# Patient Record
Sex: MALE | Race: ASIAN | NOT HISPANIC OR LATINO
[De-identification: names, ages, dates, MRNs, and addresses within clinical notes are randomized per-mention and may not be internally consistent; named-entity substitution may affect disease eponyms.]

---

## 2017-03-21 ENCOUNTER — RESULT REVIEW (OUTPATIENT)
Age: 77
End: 2017-03-21

## 2022-11-11 ENCOUNTER — INPATIENT (INPATIENT)
Facility: HOSPITAL | Age: 82
LOS: 5 days | Discharge: HOME HEALTH | End: 2022-11-17
Attending: HOSPITALIST | Admitting: HOSPITALIST
Payer: MEDICARE

## 2022-11-11 VITALS
DIASTOLIC BLOOD PRESSURE: 77 MMHG | OXYGEN SATURATION: 98 % | SYSTOLIC BLOOD PRESSURE: 148 MMHG | RESPIRATION RATE: 14 BRPM | TEMPERATURE: 98 F | HEART RATE: 69 BPM

## 2022-11-11 DIAGNOSIS — E11.9 TYPE 2 DIABETES MELLITUS WITHOUT COMPLICATIONS: ICD-10-CM

## 2022-11-11 DIAGNOSIS — R29.898 OTHER SYMPTOMS AND SIGNS INVOLVING THE MUSCULOSKELETAL SYSTEM: ICD-10-CM

## 2022-11-11 DIAGNOSIS — Z98.890 OTHER SPECIFIED POSTPROCEDURAL STATES: Chronic | ICD-10-CM

## 2022-11-11 DIAGNOSIS — D69.6 THROMBOCYTOPENIA, UNSPECIFIED: ICD-10-CM

## 2022-11-11 DIAGNOSIS — I10 ESSENTIAL (PRIMARY) HYPERTENSION: ICD-10-CM

## 2022-11-11 DIAGNOSIS — Z29.9 ENCOUNTER FOR PROPHYLACTIC MEASURES, UNSPECIFIED: ICD-10-CM

## 2022-11-11 LAB
ALBUMIN SERPL ELPH-MCNC: 4.2 G/DL — SIGNIFICANT CHANGE UP (ref 3.3–5)
ALP SERPL-CCNC: 71 U/L — SIGNIFICANT CHANGE UP (ref 40–120)
ALT FLD-CCNC: 15 U/L — SIGNIFICANT CHANGE UP (ref 4–41)
ANION GAP SERPL CALC-SCNC: 10 MMOL/L — SIGNIFICANT CHANGE UP (ref 7–14)
APPEARANCE UR: CLEAR — SIGNIFICANT CHANGE UP
AST SERPL-CCNC: 18 U/L — SIGNIFICANT CHANGE UP (ref 4–40)
BASOPHILS # BLD AUTO: 0.01 K/UL — SIGNIFICANT CHANGE UP (ref 0–0.2)
BASOPHILS NFR BLD AUTO: 0.2 % — SIGNIFICANT CHANGE UP (ref 0–2)
BILIRUB SERPL-MCNC: 0.4 MG/DL — SIGNIFICANT CHANGE UP (ref 0.2–1.2)
BILIRUB UR-MCNC: NEGATIVE — SIGNIFICANT CHANGE UP
BUN SERPL-MCNC: 26 MG/DL — HIGH (ref 7–23)
CALCIUM SERPL-MCNC: 9.7 MG/DL — SIGNIFICANT CHANGE UP (ref 8.4–10.5)
CHLORIDE SERPL-SCNC: 104 MMOL/L — SIGNIFICANT CHANGE UP (ref 98–107)
CO2 SERPL-SCNC: 26 MMOL/L — SIGNIFICANT CHANGE UP (ref 22–31)
COLOR SPEC: SIGNIFICANT CHANGE UP
CREAT SERPL-MCNC: 1.01 MG/DL — SIGNIFICANT CHANGE UP (ref 0.5–1.3)
DIFF PNL FLD: NEGATIVE — SIGNIFICANT CHANGE UP
EGFR: 75 ML/MIN/1.73M2 — SIGNIFICANT CHANGE UP
EOSINOPHIL # BLD AUTO: 0.16 K/UL — SIGNIFICANT CHANGE UP (ref 0–0.5)
EOSINOPHIL NFR BLD AUTO: 3 % — SIGNIFICANT CHANGE UP (ref 0–6)
FLUAV AG NPH QL: SIGNIFICANT CHANGE UP
FLUBV AG NPH QL: SIGNIFICANT CHANGE UP
GLUCOSE BLDC GLUCOMTR-MCNC: 127 MG/DL — HIGH (ref 70–99)
GLUCOSE SERPL-MCNC: 129 MG/DL — HIGH (ref 70–99)
GLUCOSE UR QL: ABNORMAL
HCT VFR BLD CALC: 44.2 % — SIGNIFICANT CHANGE UP (ref 39–50)
HGB BLD-MCNC: 14.7 G/DL — SIGNIFICANT CHANGE UP (ref 13–17)
IANC: 3.42 K/UL — SIGNIFICANT CHANGE UP (ref 1.8–7.4)
IMM GRANULOCYTES NFR BLD AUTO: 0.4 % — SIGNIFICANT CHANGE UP (ref 0–0.9)
KETONES UR-MCNC: NEGATIVE — SIGNIFICANT CHANGE UP
LEUKOCYTE ESTERASE UR-ACNC: NEGATIVE — SIGNIFICANT CHANGE UP
LYMPHOCYTES # BLD AUTO: 1.24 K/UL — SIGNIFICANT CHANGE UP (ref 1–3.3)
LYMPHOCYTES # BLD AUTO: 23.3 % — SIGNIFICANT CHANGE UP (ref 13–44)
MAGNESIUM SERPL-MCNC: 2 MG/DL — SIGNIFICANT CHANGE UP (ref 1.6–2.6)
MCHC RBC-ENTMCNC: 29 PG — SIGNIFICANT CHANGE UP (ref 27–34)
MCHC RBC-ENTMCNC: 33.3 GM/DL — SIGNIFICANT CHANGE UP (ref 32–36)
MCV RBC AUTO: 87.2 FL — SIGNIFICANT CHANGE UP (ref 80–100)
MONOCYTES # BLD AUTO: 0.48 K/UL — SIGNIFICANT CHANGE UP (ref 0–0.9)
MONOCYTES NFR BLD AUTO: 9 % — SIGNIFICANT CHANGE UP (ref 2–14)
NEUTROPHILS # BLD AUTO: 3.42 K/UL — SIGNIFICANT CHANGE UP (ref 1.8–7.4)
NEUTROPHILS NFR BLD AUTO: 64.1 % — SIGNIFICANT CHANGE UP (ref 43–77)
NITRITE UR-MCNC: NEGATIVE — SIGNIFICANT CHANGE UP
NRBC # BLD: 0 /100 WBCS — SIGNIFICANT CHANGE UP (ref 0–0)
NRBC # FLD: 0 K/UL — SIGNIFICANT CHANGE UP (ref 0–0)
PH UR: 6 — SIGNIFICANT CHANGE UP (ref 5–8)
PHOSPHATE SERPL-MCNC: 3.3 MG/DL — SIGNIFICANT CHANGE UP (ref 2.5–4.5)
PLATELET # BLD AUTO: 130 K/UL — LOW (ref 150–400)
POTASSIUM SERPL-MCNC: 4.4 MMOL/L — SIGNIFICANT CHANGE UP (ref 3.5–5.3)
POTASSIUM SERPL-SCNC: 4.4 MMOL/L — SIGNIFICANT CHANGE UP (ref 3.5–5.3)
PROT SERPL-MCNC: 7.2 G/DL — SIGNIFICANT CHANGE UP (ref 6–8.3)
PROT UR-MCNC: NEGATIVE — SIGNIFICANT CHANGE UP
RBC # BLD: 5.07 M/UL — SIGNIFICANT CHANGE UP (ref 4.2–5.8)
RBC # FLD: 14.6 % — HIGH (ref 10.3–14.5)
RBC CASTS # UR COMP ASSIST: SIGNIFICANT CHANGE UP /HPF (ref 0–4)
RSV RNA NPH QL NAA+NON-PROBE: SIGNIFICANT CHANGE UP
SARS-COV-2 RNA SPEC QL NAA+PROBE: SIGNIFICANT CHANGE UP
SODIUM SERPL-SCNC: 140 MMOL/L — SIGNIFICANT CHANGE UP (ref 135–145)
SP GR SPEC: 1.03 — SIGNIFICANT CHANGE UP (ref 1.01–1.05)
TROPONIN T, HIGH SENSITIVITY RESULT: 13 NG/L — SIGNIFICANT CHANGE UP
TROPONIN T, HIGH SENSITIVITY RESULT: 13 NG/L — SIGNIFICANT CHANGE UP
UROBILINOGEN FLD QL: SIGNIFICANT CHANGE UP
WBC # BLD: 5.33 K/UL — SIGNIFICANT CHANGE UP (ref 3.8–10.5)
WBC # FLD AUTO: 5.33 K/UL — SIGNIFICANT CHANGE UP (ref 3.8–10.5)
WBC UR QL: SIGNIFICANT CHANGE UP /HPF (ref 0–5)

## 2022-11-11 PROCEDURE — 72131 CT LUMBAR SPINE W/O DYE: CPT | Mod: 26,MA

## 2022-11-11 PROCEDURE — 99285 EMERGENCY DEPT VISIT HI MDM: CPT | Mod: GC

## 2022-11-11 PROCEDURE — 99223 1ST HOSP IP/OBS HIGH 75: CPT

## 2022-11-11 PROCEDURE — 72125 CT NECK SPINE W/O DYE: CPT | Mod: 26,MA

## 2022-11-11 PROCEDURE — 70450 CT HEAD/BRAIN W/O DYE: CPT | Mod: 26,MA

## 2022-11-11 RX ORDER — INSULIN LISPRO 100/ML
VIAL (ML) SUBCUTANEOUS
Refills: 0 | Status: DISCONTINUED | OUTPATIENT
Start: 2022-11-11 | End: 2022-11-17

## 2022-11-11 RX ORDER — DEXTROSE 50 % IN WATER 50 %
15 SYRINGE (ML) INTRAVENOUS ONCE
Refills: 0 | Status: DISCONTINUED | OUTPATIENT
Start: 2022-11-11 | End: 2022-11-17

## 2022-11-11 RX ORDER — ENOXAPARIN SODIUM 100 MG/ML
40 INJECTION SUBCUTANEOUS EVERY 24 HOURS
Refills: 0 | Status: DISCONTINUED | OUTPATIENT
Start: 2022-11-11 | End: 2022-11-17

## 2022-11-11 RX ORDER — SODIUM CHLORIDE 9 MG/ML
1000 INJECTION, SOLUTION INTRAVENOUS
Refills: 0 | Status: DISCONTINUED | OUTPATIENT
Start: 2022-11-11 | End: 2022-11-17

## 2022-11-11 RX ORDER — EMPAGLIFLOZIN 10 MG/1
1 TABLET, FILM COATED ORAL
Qty: 0 | Refills: 0 | DISCHARGE

## 2022-11-11 RX ORDER — DEXTROSE 50 % IN WATER 50 %
25 SYRINGE (ML) INTRAVENOUS ONCE
Refills: 0 | Status: DISCONTINUED | OUTPATIENT
Start: 2022-11-11 | End: 2022-11-17

## 2022-11-11 RX ORDER — GLUCAGON INJECTION, SOLUTION 0.5 MG/.1ML
1 INJECTION, SOLUTION SUBCUTANEOUS ONCE
Refills: 0 | Status: DISCONTINUED | OUTPATIENT
Start: 2022-11-11 | End: 2022-11-17

## 2022-11-11 RX ORDER — METFORMIN HYDROCHLORIDE 850 MG/1
1 TABLET ORAL
Qty: 0 | Refills: 0 | DISCHARGE

## 2022-11-11 RX ORDER — AMLODIPINE BESYLATE 2.5 MG/1
5 TABLET ORAL DAILY
Refills: 0 | Status: DISCONTINUED | OUTPATIENT
Start: 2022-11-11 | End: 2022-11-13

## 2022-11-11 RX ORDER — ACETAMINOPHEN 500 MG
650 TABLET ORAL EVERY 6 HOURS
Refills: 0 | Status: DISCONTINUED | OUTPATIENT
Start: 2022-11-11 | End: 2022-11-17

## 2022-11-11 NOTE — ED PROVIDER NOTE - OBJECTIVE STATEMENT
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 and inability to walk since yesterday. Endorse gradual increase of weakness for the last week, since yesterday has felt "very weak" unable to get up. no headache no neck pain. no cp/sob no abdominal pain. no n/v/d no dysuria no melena. +weakness of both lower ext, right greater than left. no back pain. no urine or bowel incontinence or weakness.

## 2022-11-11 NOTE — ED PROVIDER NOTE - PHYSICAL EXAMINATION
Dr Pierre  elderly male laying in bed, AO3, [place] no sign of head/facial trauma  normal S1-S2 HR 69  nontender chest wall/back. no ecchymosis of back. nontender  midline cervical/thoracic or lumbar spine no step off.   soft nontender abdomen. no  rebound. no guarding. no sign of trauma. no CVAT stable pelvis   no shortening or rotation of bl lower ext. nl sensation. bl leg weakness w right more than left. no leg swelling.

## 2022-11-11 NOTE — H&P ADULT - NSHPLABSRESULTS_GEN_ALL_CORE
.  LABS:                         14.7   5.33  )-----------( 130      ( 2022 15:50 )             44.2         140  |  104  |  26<H>  ----------------------------<  129<H>  4.4   |  26  |  1.01    Ca    9.7      2022 15:50  Phos  3.3       Mg     2.00         TPro  7.2  /  Alb  4.2  /  TBili  0.4  /  DBili  x   /  AST  18  /  ALT  15  /  AlkPhos  71        Urinalysis Basic - ( 2022 15:50 )    Color: Light Yellow / Appearance: Clear / S.032 / pH: x  Gluc: x / Ketone: Negative  / Bili: Negative / Urobili: <2 mg/dL   Blood: x / Protein: Negative / Nitrite: Negative   Leuk Esterase: Negative / RBC: na /HPF / WBC na /HPF   Sq Epi: x / Non Sq Epi: x / Bacteria: x                RADIOLOGY, EKG & ADDITIONAL TESTS: Reviewed.

## 2022-11-11 NOTE — CONSULT NOTE ADULT - SUBJECTIVE AND OBJECTIVE BOX
Neurology - Consult Note - 11-11-22  -  Gracie Henderson DO  PGY-2 Neurology  Spectra: [phone] ([facility]), [phone] ([facility])  -    Name: ANTHONY YAN; 82y (1940)  Chief Complaint: leg weakness    HPI: 81yo M hx of HTN, DM, from home w daughter presents with weakness and a fall last week at home. patient reports inability to walk since yesterday. Endorse gradual increase of weakness for the last week, since yesterday has felt "very weak" unable to get up. MRA as outpatient was apparently not significant for stroke but showed atherosclerosis.      Review of Systems: refer to HPI     Allergies:    PMHx:     PFHx:     PSuHx:       Medications:  MEDICATIONS  (STANDING):  MEDICATIONS  (PRN):    Vitals:  T(C): 36.7 (11-11-22 @ 16:38), Max: 36.7 (11-11-22 @ 16:38)  HR: 72 (11-11-22 @ 16:38) (69 - 72)  BP: 144/76 (11-11-22 @ 16:38) (144/76 - 148/77)  RR: 16 (11-11-22 @ 16:38) (14 - 16)  SpO2: 98% (11-11-22 @ 16:38) (98% - 98%)        Neurological Examination:    - Mental Status: Alert, awake, oriented to person, place, and time; speech is fluent with intact naming, repetition, and follows 1-step and 3-step mid-line crossing commands; good overall fund of knowledge (aware of current events, relevant past history, and vocabulary appropriate for level of education); immediate recall is 3/3 words and delayed recall is 3/3 words at 5 minutes; able to spell WORLD backwards and recite the days of the week in reverse; able to read a sentence.    - Cranial Nerves:  II: Visual fields are full to confrontation; pupils are equal, round, and reactive to light   III, IV, VI: Extraocular movements are intact without nystagmus  V: Facial sensation is intact in the V1-V3 distribution bilaterally  VII: Face is symmetric with normal eye closure and smile  VIII: Hearing is intact to finger rub  IX, X: Uvula is midline and soft palate rises symmetrically  XI: Shoulder shrug intact  XII: Tongue protrudes in the midline    - Motor/Strength Testing:                                 Right           Left  Deltoid                     5                 5  Biceps                      5                 5  Triceps                     5                 5  Wrist Ext (radial)       5                 5  Hand                  5                 5    Hip Flex                   5                  5  Knee Ext	      5                  5  Dorsiflex                  5                  5  Plantarflex               5                  5    - There is no pronator drift.   - Normal muscle bulk and tone throughout.    - Reflexes:   Bicep (C5/C6):                  R 2+ --- L 2+   Brachioradialis (C5/C6) :   R 2+ --- L 2+   Patella (L3/L4) :                 R 2+ --- L 2+   Ankle (S1) :                       R 2+ --- L 2+     - Plant responses down bilaterally.    - Sensory: Intact throughout to light touch.   - Coordination: Finger-nose-finger intact without ataxia or dysmetria.    - Gait: Normal steps, base, arm swing, and turning.     Labs:                        14.7   5.33  )-----------( 130      ( 11 Nov 2022 15:50 )             44.2     11-11    140  |  104  |  26<H>  ----------------------------<  129<H>  4.4   |  26  |  1.01    Ca    9.7      11 Nov 2022 15:50  Phos  3.3     11-11  Mg     2.00     11-11    TPro  7.2  /  Alb  4.2  /  TBili  0.4  /  DBili  x   /  AST  18  /  ALT  15  /  AlkPhos  71  11-11    CAPILLARY BLOOD GLUCOSE      POCT Blood Glucose.: 210 mg/dL (11 Nov 2022 12:18)    LIVER FUNCTIONS - ( 11 Nov 2022 15:50 )  Alb: 4.2 g/dL / Pro: 7.2 g/dL / ALK PHOS: 71 U/L / ALT: 15 U/L / AST: 18 U/L / GGT: x             Radiology:     Neurology - Consult Note - 11-11-22  -  Gracie Henderson DO  PGY-2 Neurology  Spectra: [phone] ([facility]), [phone] ([facility])  -    Name: ANTHONY YAN; 82y (1940)  Chief Complaint: leg weakness    HPI: 81yo M hx of HTN, DM, presents from home with weakness and a fall last week at home. Wife at bedside reports he was getting up from sitting after putting on his shoes when he fell over. Patient reports he went down hard but did not hit head or lose consciousness. Patient reports feeling unbalanced but not dizzy. Weakness in both legs has started since then and has progressively worsened over the past week. Patient states he started walking with a cane since the fall on November 3rd. He says he can walk but he feels like he will fall. Per primary team, MRA as an outpatient was apparently not significant for stroke but showed atherosclerosis. Patient says he started physical therapy in July for overall health. He reports the past week he has pain in both hamstrings that he describes as "an alligator bit a hole in the back of his legs". Described as sharp, throbbing, and constant. Walking or using his legs does not make the pain worse. He thinks it is related to PT and the weakness and pain are inter-related. He saw his PCP for posterior thigh pain but said nothing was wrong. Denies rash in the area. Denies pain radiating from back to legs or pain radiating down legs from thighs. The pain is only located in the hamstring region. Patient denies vision changes, numbness, tingling, burning, tinnitus, headache, n/v, fever, chills, weight loss or gain without trying, back pain. Endorses urinary frequency but wife says that has been ongoing for quite some time.    Review of Systems: refer to HPI     Allergies:    PMHx:     PFHx:     PSuHx:       Medications:  MEDICATIONS  (STANDING):  MEDICATIONS  (PRN):    Vitals:  T(C): 36.7 (11-11-22 @ 16:38), Max: 36.7 (11-11-22 @ 16:38)  HR: 72 (11-11-22 @ 16:38) (69 - 72)  BP: 144/76 (11-11-22 @ 16:38) (144/76 - 148/77)  RR: 16 (11-11-22 @ 16:38) (14 - 16)  SpO2: 98% (11-11-22 @ 16:38) (98% - 98%)      Neurological Examination:    - Mental Status: Alert, awake, oriented to person, place, and time; speech is fluent with intact naming, repetition, and follows 1-step acommands    - Cranial Nerves:  II: Visual fields are full to confrontation; pupils are equal, round, and reactive to light   III, IV, VI: Extraocular movements are intact without nystagmus  V: Facial sensation is intact in the V1-V3 distribution bilaterally  VII: Face is symmetric with normal eye closure and smile  VIII: Hearing is intact to finger rub  IX, X: Uvula is midline and soft palate rises symmetrically  XI: Shoulder shrug intact  XII: Tongue protrudes in the midline    - Motor/Strength Testing:                                 Right           Left  Deltoid                     5                 5  Biceps                      4+                 4+  Triceps                     4+                 4+  Hand                  5                 5    Hip Flex                   4+                  5  Knee Ext	      5                  5  Dorsiflex                  5                  5  Plantarflex               5                  5  - There is no pronator drift.     - Reflexes:   Bicep (C5/C6):                  R 2+ --- L 2+   Brachioradialis (C5/C6) :   R 2+ --- L 2+   Patella (L3/L4) :                 R 2+ --- L 2+   Ankle (S1) :                       R 1+ --- L 1+     - Plantar responses neutral  - Sensory: Intact throughout to light touch.   - Coordination: Finger-nose-finger intact without ataxia or dysmetria.    - Gait: shuffling gait with narrow stance. When standing does not sway and looks strong and confident. When walking does not sway but takes little steps and confidence decreases. does not complain about pain while walking     Labs:                        14.7   5.33  )-----------( 130      ( 11 Nov 2022 15:50 )             44.2     11-11    140  |  104  |  26<H>  ----------------------------<  129<H>  4.4   |  26  |  1.01    Ca    9.7      11 Nov 2022 15:50  Phos  3.3     11-11  Mg     2.00     11-11    TPro  7.2  /  Alb  4.2  /  TBili  0.4  /  DBili  x   /  AST  18  /  ALT  15  /  AlkPhos  71  11-11    CAPILLARY BLOOD GLUCOSE      POCT Blood Glucose.: 210 mg/dL (11 Nov 2022 12:18)    LIVER FUNCTIONS - ( 11 Nov 2022 15:50 )  Alb: 4.2 g/dL / Pro: 7.2 g/dL / ALK PHOS: 71 U/L / ALT: 15 U/L / AST: 18 U/L / GGT: x             Radiology:  CT  Head No intracranial hemorrhage.  CERVICAL SPINE: No acute fracture or traumatic subluxation  L-spine: No acute fracture or traumatic subluxation. Multilevel degenerative change    MRI       Neurology - Consult Note - 11-11-22  -  Gracie Henderson DO  PGY-2 Neurology  Spectra: [phone] ([facility]), [phone] ([facility])  -    Name: ANTHONY YAN; 82y (1940)  Chief Complaint: leg weakness    HPI: 83yo M hx of HTN, DM, presents from home with weakness and a fall last week at home. Wife at bedside reports he was getting up from sitting after putting on his shoes when he fell over. Patient reports he went down hard but did not hit head or lose consciousness. Patient reports feeling unbalanced but not dizzy. Weakness in both legs has started since then and has progressively worsened over the past week. Patient states he started walking with a cane since the fall on November 3rd. He says he can walk but he feels like he will fall. Per primary team, MRA as an outpatient was apparently not significant for stroke but showed atherosclerosis. Patient says he started physical therapy in July for overall health. He reports the past week he has pain in both hamstrings that he describes as "an alligator bit a hole in the back of his legs". Described as sharp, throbbing, and constant. Walking or using his legs does not make the pain worse. He thinks it is related to PT and the weakness and pain are inter-related. He saw his PCP for posterior thigh pain but said nothing was wrong. Denies rash in the area. Denies pain radiating from back to legs or pain radiating down legs from thighs. The pain is only located in the hamstring region. Patient denies vision changes, numbness, tingling, burning, tinnitus, headache, n/v, fever, chills, weight loss or gain without trying, back pain. Endorses urinary frequency but wife says that has been ongoing for quite some time.    Review of Systems: refer to HPI     Allergies:    PMHx:     PFHx:     PSuHx:       Medications:  MEDICATIONS  (STANDING):  MEDICATIONS  (PRN):    Vitals:  T(C): 36.7 (11-11-22 @ 16:38), Max: 36.7 (11-11-22 @ 16:38)  HR: 72 (11-11-22 @ 16:38) (69 - 72)  BP: 144/76 (11-11-22 @ 16:38) (144/76 - 148/77)  RR: 16 (11-11-22 @ 16:38) (14 - 16)  SpO2: 98% (11-11-22 @ 16:38) (98% - 98%)      Neurological Examination:    - Mental Status: Alert, awake, oriented to person, place, and time; speech is fluent with intact naming, repetition, and follows 1-step acommands    - Cranial Nerves:  II: Visual fields are full to confrontation; pupils are equal, round, and reactive to light   III, IV, VI: Extraocular movements are intact without nystagmus  V: Facial sensation is intact in the V1-V3 distribution bilaterally  VII: Face is symmetric with normal eye closure and smile  VIII: Hearing is intact to finger rub  IX, X: Uvula is midline and soft palate rises symmetrically  XI: Shoulder shrug intact  XII: Tongue protrudes in the midline    - Motor/Strength Testing:                                 Right           Left  Deltoid                     5                 5  Biceps                      4+                 4+  Triceps                     4+                 4+  Hand                  5                 5    Hip Flex                   4+                  5  Knee Ext	      5                  5  Dorsiflex                  5                  5  Plantarflex               5                  5  - There is no pronator drift.   -paratonic throughout     - Reflexes:   Bicep (C5/C6):                  R 2+ --- L 2+   Brachioradialis (C5/C6) :   R 2+ --- L 2+   Patella (L3/L4) :                 R 2+ --- L 2+   Ankle (S1) :                       R 1+ --- L 1+     - Plantar responses neutral  - Sensory: Intact throughout to light touch.   - Coordination: Finger-nose-finger intact without ataxia or dysmetria.    - Gait: shuffling gait with narrow stance. When standing does not sway and looks strong and confident. When walking does not sway but takes little steps and confidence decreases. does not complain about pain while walking     Labs:                        14.7   5.33  )-----------( 130      ( 11 Nov 2022 15:50 )             44.2     11-11    140  |  104  |  26<H>  ----------------------------<  129<H>  4.4   |  26  |  1.01    Ca    9.7      11 Nov 2022 15:50  Phos  3.3     11-11  Mg     2.00     11-11    TPro  7.2  /  Alb  4.2  /  TBili  0.4  /  DBili  x   /  AST  18  /  ALT  15  /  AlkPhos  71  11-11    CAPILLARY BLOOD GLUCOSE      POCT Blood Glucose.: 210 mg/dL (11 Nov 2022 12:18)    LIVER FUNCTIONS - ( 11 Nov 2022 15:50 )  Alb: 4.2 g/dL / Pro: 7.2 g/dL / ALK PHOS: 71 U/L / ALT: 15 U/L / AST: 18 U/L / GGT: x             Radiology:  CT  Head No intracranial hemorrhage.  CERVICAL SPINE: No acute fracture or traumatic subluxation  L-spine: No acute fracture or traumatic subluxation. Multilevel degenerative change    MRI

## 2022-11-11 NOTE — H&P ADULT - HISTORY OF PRESENT ILLNESS
83yo M hx of HTN, DM, presents from home with weakness and a fall last week at home.     In ED, vitals T 97.8, HR 89, /77, RR 14, O2 98% on RA  Labs significant for plt 130  EKG:  CTH, CT-c spine: IMPRESSION:    BRAIN CT : No intracranial hemorrhage.  CERVICAL SPINE CT: No acute fracture or traumatic subluxation  CT L-spine: IMPRESSION:  No acute fracture or traumatic subluxation. Multilevel   degenerative change  ED management: Neuro consulted    81yo M hx of HTN, DM, presents from home with b/l LE weakness and a fall last week at home. Pt states symptoms of b/l LE weakness initially started with his right leg about 1 week ago and then progressed to his left leg. He states he was able to ambulate independently however now has to use a cane this week. He states the weakness is mainly in behind his thighs. He denies any numbness, tingling or pain. Denies any b/b incontinence, light headedness, dizziness, or HA. He states he fell 1 week ago due to the weakness. He denies hitting his head or LOC. 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 He denies any fever, chills, chest pain, abd pain, n/v/d, or urinary symptoms.      In ED, vitals T 97.8, HR 89, /77, RR 14, O2 98% on RA  Labs significant for plt 130  EKG: NSR w/ 1st degree AV block, incomplete rbbb, no acute ischemic changes  CTH, CT-c spine: IMPRESSION:    BRAIN CT : No intracranial hemorrhage.  CERVICAL SPINE CT: No acute fracture or traumatic subluxation  CT L-spine: IMPRESSION:  No acute fracture or traumatic subluxation. Multilple  degenerative change  ED management: Neuro consulted

## 2022-11-11 NOTE — H&P ADULT - PROBLEM SELECTOR PLAN 4
Plt 130, previous plt 141 in 2019 w/ w/u in past. Neg HIV and hep C and MM w/u. \  - continue to monitor

## 2022-11-11 NOTE — CONSULT NOTE ADULT - ATTENDING COMMENTS
History from family and patient.   Baseline walks long distances without difficulty.    On 11/3 he woke up from a nap and noticed gait imbalance, dysarthria.  He is also c/o B hamstring pain. Family noted left facial weakness.     MS:  Slightly slow to follow commands and answer questions.   CN: left UMN type facial weakness.  Mild dysarthria.  Motor: RSM slow, B.  No pronator drift. Variable effort - R HF with definite weakness.    Reflexes 2 in the arms. Left BR with spread to FF.  Knees 1, ankles 0.  Gait: slow narrow base, tentative.     NIHSS: 2 (Dysarthria 1; Facial palsy 1)  Baseline mRS 0    He had an MRA Head as an outpatient.   A1C with Estimated Average Glucose Result: 6.2    A/P  Mr. Williamson is an 83 yo man with sudden onset of gait difficulty, dysarthria and left facial weakness on 11/3/22.  Possible stroke.   Aspirin 81 mg now then daily.  Lipitor 80 mg now then daily  Lipid profile  MRI brain  MRA neck  TTE  Telemetry  PT/OT/Speech  D/W patient, family, primary ACP.  Thank you.

## 2022-11-11 NOTE — H&P ADULT - NSHPPHYSICALEXAM_GEN_ALL_CORE
VITAL SIGNS:  T(C): 36.6 (11-11-22 @ 19:17), Max: 36.7 (11-11-22 @ 16:38)  T(F): 97.9 (11-11-22 @ 19:17), Max: 98 (11-11-22 @ 16:38)  HR: 89 (11-11-22 @ 19:17) (69 - 89)  BP: 157/85 (11-11-22 @ 19:17) (144/76 - 157/85)  BP(mean): --  RR: 18 (11-11-22 @ 19:17) (14 - 18)  SpO2: 99% (11-11-22 @ 19:17) (98% - 99%)  Wt(kg): --    PHYSICAL EXAM:    Constitutional: WDWN resting comfortably in bed; NAD  Head: NC/AT  Eyes: PERRL, EOMI, anicteric sclera  ENT: no nasal discharge; uvula midline, no oropharyngeal erythema or exudates; MMM  Neck: supple; no JVD or thyromegaly  Respiratory: CTA B/L; no W/R/R, no retractions  Cardiac: +S1/S2; RRR; no M/R/G  Gastrointestinal: abdomen soft, NT/ND; no rebound or guarding; +BSx4  Back: spine midline, no bony tenderness or step-offs  Extremities: WWP, no clubbing or cyanosis; no peripheral edema  Musculoskeletal: NROM x4; no joint swelling, tenderness or erythema  Vascular: 2+ radial, DP/PT pulses B/L  Dermatologic: skin warm, dry and intact; no rashes, wounds, or scars  Lymphatic: no submandibular or cervical LAD  Neurologic: AAOx3; CNII-XII grossly intact; no focal deficits  Psychiatric: affect and characteristics of appearance, verbalizations, behaviors are appropriate VITAL SIGNS:  T(C): 36.6 (11-11-22 @ 19:17), Max: 36.7 (11-11-22 @ 16:38)  T(F): 97.9 (11-11-22 @ 19:17), Max: 98 (11-11-22 @ 16:38)  HR: 89 (11-11-22 @ 19:17) (69 - 89)  BP: 157/85 (11-11-22 @ 19:17) (144/76 - 157/85)  BP(mean): --  RR: 18 (11-11-22 @ 19:17) (14 - 18)  SpO2: 99% (11-11-22 @ 19:17) (98% - 99%)  Wt(kg): --    PHYSICAL EXAM:    Constitutional: WDWN resting comfortably in bed; NAD  Head: NC/AT  Eyes: PERRL, EOMI, anicteric sclera  ENT: no nasal discharge; uvula midline, no oropharyngeal erythema or exudates; MMM  Neck: supple  Respiratory: CTA B/L; no W/R/R, no retractions  Cardiac: +S1/S2; RRR; no M/R/G  Gastrointestinal: abdomen soft, NT/ND; no rebound or guarding; +BSx4  Back: spine midline, no bony tenderness  Extremities: WWP, no clubbing or cyanosis; no peripheral edema  Musculoskeletal: NROM x4; no joint swelling, tenderness or erythema  Vascular: distal pulses intact  Dermatologic: skin warm, dry and intact; no rashes  Lymphatic: no submandibular or cervical LAD  Neurologic: AAOx3; CNII-XII grossly intact; strength 4/5 RLE, 5/5 LLE. 5/5 b/l UE. Sensation intact b/l UE and LE.   Psychiatric: affect and characteristics of appearance, verbalizations, behaviors are appropriate

## 2022-11-11 NOTE — CONSULT NOTE ADULT - ASSESSMENT
Assessment:    Impression:    Plan Assessment: 81yo M hx of HTN, DM, presents from home with weakness and a fall last week at home. Wife at bedside reports he was getting up from sitting after putting on his shoes when he fell over. Patient reports he went down hard but did not hit head or lose consciousness. Patient reports feeling unbalanced but not dizzy. Weakness in both legs has started since then and has progressively worsened over the past week. Patient states he started walking with a cane since the fall on November 3rd. He says he can walk but he feels like he will fall. Per primary team, MRA as an outpatient was apparently not significant for stroke but showed atherosclerosis. Patient says he started physical therapy in July for overall health. He reports the past week he has pain in both hamstrings that he describes as "an alligator bit a hole in the back of his legs". Described as sharp, throbbing, and constant. Walking or using his legs does not make the pain worse. He thinks it is related to PT and the weakness and pain are inter-related. He saw his PCP for posterior thigh pain but said nothing was wrong. Denies rash in the area. Denies pain radiating from back to legs or pain radiating down legs from thighs. The pain is only located in the hamstring region. Patient denies vision changes, numbness, tingling, burning, tinnitus, headache, n/v, fever, chills, weight loss or gain without trying, back pain. Endorses urinary frequency but wife says that has been ongoing for quite some time. 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 Nettie Bennett. To be seen in the AM but attending. Assessment: 83yo M hx of HTN, DM, presents from home with weakness and a fall last week at home. Wife at bedside reports he was getting up from sitting after putting on his shoes when he fell over. Patient reports he went down hard but did not hit head or lose consciousness. Patient reports feeling unbalanced but not dizzy. Weakness in both legs has started since then and has progressively worsened over the past week. Patient states he started walking with a cane since the fall on November 3rd. He says he can walk but he feels like he will fall. Per primary team, MRA as an outpatient was apparently not significant for stroke but showed atherosclerosis. Patient says he started physical therapy in July for overall health. He reports the past week he has pain in both hamstrings that he describes as "an alligator bit a hole in the back of his legs". Described as sharp, throbbing, and constant. Walking or using his legs does not make the pain worse. He thinks it is related to PT and the weakness and pain are inter-related. He saw his PCP for posterior thigh pain but said nothing was wrong. Denies rash in the area. Denies pain radiating from back to legs or pain radiating down legs from thighs. The pain is only located in the hamstring region. Patient denies vision changes, numbness, tingling, burning, tinnitus, headache, n/v, fever, chills, weight loss or gain without trying, back pain. Endorses urinary frequency but wife says that has been ongoing for quite some time. CT head c- and l-spine unremarkable    Impression: bilateral leg weakness R>L accompanied by 1 fall, b/l hamstring pain 2/2 to unknown etiology    Plan  [] PT/OT evaluation      Case discussed with Neurology Attending Dr. Nettie Bennett. To be seen in the AM by attending.

## 2022-11-11 NOTE — ED PROVIDER NOTE - CLINICAL SUMMARY MEDICAL DECISION MAKING FREE TEXT BOX
Ling Gifford DO PGY-2  82 year old male with PMH DM, HTN presents with lower extremity weakness and fall last week at home. Exam with lower extremity weakness R > L. No midline spinal tenderness, no saddle anesthesia. Will obtain CTs, labs, EKG, UA, and re-evaluate for dispo.

## 2022-11-11 NOTE — H&P ADULT - ASSESSMENT
81yo M hx of HTN, DM, presents from home with LE weakness and a fall last week at home. CTH, c-spine, L-spine unremarkable w/o acute pathology. Pt admitted for further evaluation of LE weakness and PT eval.

## 2022-11-11 NOTE — ED ADULT NURSE NOTE - OBJECTIVE STATEMENT
Pt AOX4 c/o pain in Right leg travels down back of thigh, denies chest pain, denies SOB, has been experiencing weakness in that leg, fell on Sat 11/4 on the floor at home because he couldn't bear weight on the leg; pt has PMHx of DM type 2, no cardiac Hx.  Labs drawn and sent, 20G placed Left AC; pt's family at bedside.

## 2022-11-11 NOTE — ED PROVIDER NOTE - PROGRESS NOTE DETAILS
pt stable pending labs/ ct to be done. Daughter  will bring MRI report, MRI done on Tuesday of this week. sign out to night team, pt tba Ling Gifford DO PGY-2  Spoke with [facility] neuroradiologist attending Dr. Morgan who read MRA performed outpatient on Tuesday. States that he saw some atherosclerotic changes on anterior and middle cerebral artery but no findings concerning for stroke. Ling Gifford, DO PGY-2  Consulted neurology, will see patient in the ED. daughter called with additional hx that the patients brother had als

## 2022-11-11 NOTE — ED ADULT TRIAGE NOTE - CHIEF COMPLAINT QUOTE
Pt arrives in wheelchair to triage c/o unsteady gait x 1 wk. Pt had a fall in the last week, states "my legs gave out." Denies hitting head or LOC. Pt pending outpatient MRI. PMH: DM, HTN.

## 2022-11-11 NOTE — H&P ADULT - PROBLEM SELECTOR PLAN 1
Pt presenting with b/l LE weakness RLE > LLE x1 week. CTH, c-spine and l-spine w/o acute pathology. Neuro consulted and concern for myopathy, possible cord compression. Pt reports family hx of ALS.    - f/u MR l-spine  - f/u neuro recs  - neuro checks q4h

## 2022-11-11 NOTE — H&P ADULT - NSICDXFAMILYHX_GEN_ALL_CORE_FT
FAMILY HISTORY:  No pertinent family history in first degree relatives FAMILY HISTORY:  Mother  Still living? Unknown  FH: HTN (hypertension), Age at diagnosis: Age Unknown    Sibling  Still living? Unknown  FH: ALS (amyotrophic lateral sclerosis), Age at diagnosis: Age Unknown

## 2022-11-12 LAB
A1C WITH ESTIMATED AVERAGE GLUCOSE RESULT: 6.2 % — HIGH (ref 4–5.6)
ANION GAP SERPL CALC-SCNC: 10 MMOL/L — SIGNIFICANT CHANGE UP (ref 7–14)
BASOPHILS # BLD AUTO: 0.01 K/UL — SIGNIFICANT CHANGE UP (ref 0–0.2)
BASOPHILS NFR BLD AUTO: 0.2 % — SIGNIFICANT CHANGE UP (ref 0–2)
BUN SERPL-MCNC: 26 MG/DL — HIGH (ref 7–23)
CALCIUM SERPL-MCNC: 9.4 MG/DL — SIGNIFICANT CHANGE UP (ref 8.4–10.5)
CHLORIDE SERPL-SCNC: 107 MMOL/L — SIGNIFICANT CHANGE UP (ref 98–107)
CK SERPL-CCNC: 83 U/L — SIGNIFICANT CHANGE UP (ref 30–200)
CO2 SERPL-SCNC: 23 MMOL/L — SIGNIFICANT CHANGE UP (ref 22–31)
CREAT SERPL-MCNC: 0.95 MG/DL — SIGNIFICANT CHANGE UP (ref 0.5–1.3)
EGFR: 80 ML/MIN/1.73M2 — SIGNIFICANT CHANGE UP
EOSINOPHIL # BLD AUTO: 0.15 K/UL — SIGNIFICANT CHANGE UP (ref 0–0.5)
EOSINOPHIL NFR BLD AUTO: 2.9 % — SIGNIFICANT CHANGE UP (ref 0–6)
ESTIMATED AVERAGE GLUCOSE: 131 — SIGNIFICANT CHANGE UP
FOLATE SERPL-MCNC: 19.9 NG/ML — HIGH (ref 3.1–17.5)
GLUCOSE BLDC GLUCOMTR-MCNC: 119 MG/DL — HIGH (ref 70–99)
GLUCOSE BLDC GLUCOMTR-MCNC: 130 MG/DL — HIGH (ref 70–99)
GLUCOSE BLDC GLUCOMTR-MCNC: 181 MG/DL — HIGH (ref 70–99)
GLUCOSE BLDC GLUCOMTR-MCNC: 209 MG/DL — HIGH (ref 70–99)
GLUCOSE SERPL-MCNC: 136 MG/DL — HIGH (ref 70–99)
HCT VFR BLD CALC: 45.1 % — SIGNIFICANT CHANGE UP (ref 39–50)
HGB BLD-MCNC: 15.1 G/DL — SIGNIFICANT CHANGE UP (ref 13–17)
IANC: 3.16 K/UL — SIGNIFICANT CHANGE UP (ref 1.8–7.4)
IMM GRANULOCYTES NFR BLD AUTO: 0.2 % — SIGNIFICANT CHANGE UP (ref 0–0.9)
LYMPHOCYTES # BLD AUTO: 1.36 K/UL — SIGNIFICANT CHANGE UP (ref 1–3.3)
LYMPHOCYTES # BLD AUTO: 26.5 % — SIGNIFICANT CHANGE UP (ref 13–44)
MAGNESIUM SERPL-MCNC: 2 MG/DL — SIGNIFICANT CHANGE UP (ref 1.6–2.6)
MCHC RBC-ENTMCNC: 28.9 PG — SIGNIFICANT CHANGE UP (ref 27–34)
MCHC RBC-ENTMCNC: 33.5 GM/DL — SIGNIFICANT CHANGE UP (ref 32–36)
MCV RBC AUTO: 86.4 FL — SIGNIFICANT CHANGE UP (ref 80–100)
MONOCYTES # BLD AUTO: 0.45 K/UL — SIGNIFICANT CHANGE UP (ref 0–0.9)
MONOCYTES NFR BLD AUTO: 8.8 % — SIGNIFICANT CHANGE UP (ref 2–14)
NEUTROPHILS # BLD AUTO: 3.16 K/UL — SIGNIFICANT CHANGE UP (ref 1.8–7.4)
NEUTROPHILS NFR BLD AUTO: 61.4 % — SIGNIFICANT CHANGE UP (ref 43–77)
NRBC # BLD: 0 /100 WBCS — SIGNIFICANT CHANGE UP (ref 0–0)
NRBC # FLD: 0 K/UL — SIGNIFICANT CHANGE UP (ref 0–0)
PHOSPHATE SERPL-MCNC: 3.7 MG/DL — SIGNIFICANT CHANGE UP (ref 2.5–4.5)
PLATELET # BLD AUTO: 122 K/UL — LOW (ref 150–400)
POTASSIUM SERPL-MCNC: 3.8 MMOL/L — SIGNIFICANT CHANGE UP (ref 3.5–5.3)
POTASSIUM SERPL-SCNC: 3.8 MMOL/L — SIGNIFICANT CHANGE UP (ref 3.5–5.3)
RBC # BLD: 5.22 M/UL — SIGNIFICANT CHANGE UP (ref 4.2–5.8)
RBC # FLD: 14.4 % — SIGNIFICANT CHANGE UP (ref 10.3–14.5)
SODIUM SERPL-SCNC: 140 MMOL/L — SIGNIFICANT CHANGE UP (ref 135–145)
TSH SERPL-MCNC: 2.07 UIU/ML — SIGNIFICANT CHANGE UP (ref 0.27–4.2)
VIT B12 SERPL-MCNC: 922 PG/ML — HIGH (ref 200–900)
WBC # BLD: 5.14 K/UL — SIGNIFICANT CHANGE UP (ref 3.8–10.5)
WBC # FLD AUTO: 5.14 K/UL — SIGNIFICANT CHANGE UP (ref 3.8–10.5)

## 2022-11-12 PROCEDURE — 99223 1ST HOSP IP/OBS HIGH 75: CPT

## 2022-11-12 PROCEDURE — 99232 SBSQ HOSP IP/OBS MODERATE 35: CPT

## 2022-11-12 RX ORDER — ATORVASTATIN CALCIUM 80 MG/1
80 TABLET, FILM COATED ORAL AT BEDTIME
Refills: 0 | Status: DISCONTINUED | OUTPATIENT
Start: 2022-11-12 | End: 2022-11-17

## 2022-11-12 RX ORDER — ASPIRIN/CALCIUM CARB/MAGNESIUM 324 MG
81 TABLET ORAL DAILY
Refills: 0 | Status: DISCONTINUED | OUTPATIENT
Start: 2022-11-12 | End: 2022-11-17

## 2022-11-12 RX ORDER — BNT162B2 ORIGINAL AND OMICRON BA.4/BA.5 .1125; .1125 MG/2.25ML; MG/2.25ML
0.3 INJECTION, SUSPENSION INTRAMUSCULAR ONCE
Refills: 0 | Status: DISCONTINUED | OUTPATIENT
Start: 2022-11-12 | End: 2022-11-17

## 2022-11-12 RX ADMIN — Medication 2: at 12:57

## 2022-11-12 RX ADMIN — ENOXAPARIN SODIUM 40 MILLIGRAM(S): 100 INJECTION SUBCUTANEOUS at 05:47

## 2022-11-12 RX ADMIN — Medication 81 MILLIGRAM(S): at 18:03

## 2022-11-12 RX ADMIN — AMLODIPINE BESYLATE 5 MILLIGRAM(S): 2.5 TABLET ORAL at 05:47

## 2022-11-12 RX ADMIN — ATORVASTATIN CALCIUM 80 MILLIGRAM(S): 80 TABLET, FILM COATED ORAL at 23:02

## 2022-11-12 NOTE — PHYSICAL THERAPY INITIAL EVALUATION ADULT - ADDITIONAL COMMENTS
Pt lives with his wife in a house with 0 stairs to enter; pt resides on the first floor. pt stated that he ambulated without an assistive device until ~1 week ago when he started using a straight cane 2/2 weakness. Pt endorsed 1 fall over the last year.     Pt. left comfortable in bed with all tubes/lines intact, call bell in reach and in NAD. Pt's spouse at bedside.

## 2022-11-12 NOTE — PATIENT PROFILE ADULT - FALL HARM RISK - HARM RISK INTERVENTIONS

## 2022-11-12 NOTE — PHYSICAL THERAPY INITIAL EVALUATION ADULT - NSPTDISCHREC_GEN_A_CORE
anticipated discharge to home with home PT services to address current functional limitations to optimize safety within the home environment with the use of a rolling walker./Home PT anticipated discharge to home with home PT services and family assistance to address current functional limitations to optimize safety within the home environment with the use of a rolling walker./Home PT

## 2022-11-12 NOTE — PATIENT PROFILE ADULT - NSPROGENOTHERPROVIDER_GEN_A_NUR
----- Message from Khushi Eddy sent at 3/12/2019 10:45 AM CDT -----  Contact: Manuela- spouse  2nd request- pt really needs to have this taken care of.    Manuela is requesting orders for pt's oxygen. Orders are needed at [employer] for pt's oxygen. Please call Manuela back at [phone]. Oxygen company that Dr. Vargas referred pt to will not accept pt due to contract issue.       Thanks,   Khushi Eddy     none

## 2022-11-12 NOTE — PHYSICAL THERAPY INITIAL EVALUATION ADULT - PERTINENT HX OF CURRENT PROBLEM, REHAB EVAL
Pt is an 82 year old male who presented to [facility] with LE weakness and a fall last week at home. CTH, c-spine, L-spine unremarkable w/o acute pathology.

## 2022-11-13 LAB
ANION GAP SERPL CALC-SCNC: 13 MMOL/L — SIGNIFICANT CHANGE UP (ref 7–14)
BASOPHILS # BLD AUTO: 0.01 K/UL — SIGNIFICANT CHANGE UP (ref 0–0.2)
BASOPHILS NFR BLD AUTO: 0.2 % — SIGNIFICANT CHANGE UP (ref 0–2)
BUN SERPL-MCNC: 31 MG/DL — HIGH (ref 7–23)
CALCIUM SERPL-MCNC: 9.6 MG/DL — SIGNIFICANT CHANGE UP (ref 8.4–10.5)
CHLORIDE SERPL-SCNC: 107 MMOL/L — SIGNIFICANT CHANGE UP (ref 98–107)
CHOLEST SERPL-MCNC: 167 MG/DL — SIGNIFICANT CHANGE UP
CO2 SERPL-SCNC: 22 MMOL/L — SIGNIFICANT CHANGE UP (ref 22–31)
CREAT SERPL-MCNC: 0.93 MG/DL — SIGNIFICANT CHANGE UP (ref 0.5–1.3)
CULTURE RESULTS: SIGNIFICANT CHANGE UP
EGFR: 82 ML/MIN/1.73M2 — SIGNIFICANT CHANGE UP
EOSINOPHIL # BLD AUTO: 0.2 K/UL — SIGNIFICANT CHANGE UP (ref 0–0.5)
EOSINOPHIL NFR BLD AUTO: 3.5 % — SIGNIFICANT CHANGE UP (ref 0–6)
GLUCOSE BLDC GLUCOMTR-MCNC: 147 MG/DL — HIGH (ref 70–99)
GLUCOSE BLDC GLUCOMTR-MCNC: 157 MG/DL — HIGH (ref 70–99)
GLUCOSE BLDC GLUCOMTR-MCNC: 179 MG/DL — HIGH (ref 70–99)
GLUCOSE BLDC GLUCOMTR-MCNC: 231 MG/DL — HIGH (ref 70–99)
GLUCOSE SERPL-MCNC: 140 MG/DL — HIGH (ref 70–99)
HCT VFR BLD CALC: 43.9 % — SIGNIFICANT CHANGE UP (ref 39–50)
HDLC SERPL-MCNC: 51 MG/DL — SIGNIFICANT CHANGE UP
HGB BLD-MCNC: 14.5 G/DL — SIGNIFICANT CHANGE UP (ref 13–17)
IANC: 3.16 K/UL — SIGNIFICANT CHANGE UP (ref 1.8–7.4)
IMM GRANULOCYTES NFR BLD AUTO: 0.5 % — SIGNIFICANT CHANGE UP (ref 0–0.9)
LIPID PNL WITH DIRECT LDL SERPL: 103 MG/DL — HIGH
LYMPHOCYTES # BLD AUTO: 1.79 K/UL — SIGNIFICANT CHANGE UP (ref 1–3.3)
LYMPHOCYTES # BLD AUTO: 31.5 % — SIGNIFICANT CHANGE UP (ref 13–44)
MAGNESIUM SERPL-MCNC: 1.9 MG/DL — SIGNIFICANT CHANGE UP (ref 1.6–2.6)
MCHC RBC-ENTMCNC: 28.5 PG — SIGNIFICANT CHANGE UP (ref 27–34)
MCHC RBC-ENTMCNC: 33 GM/DL — SIGNIFICANT CHANGE UP (ref 32–36)
MCV RBC AUTO: 86.4 FL — SIGNIFICANT CHANGE UP (ref 80–100)
MONOCYTES # BLD AUTO: 0.5 K/UL — SIGNIFICANT CHANGE UP (ref 0–0.9)
MONOCYTES NFR BLD AUTO: 8.8 % — SIGNIFICANT CHANGE UP (ref 2–14)
NEUTROPHILS # BLD AUTO: 3.16 K/UL — SIGNIFICANT CHANGE UP (ref 1.8–7.4)
NEUTROPHILS NFR BLD AUTO: 55.5 % — SIGNIFICANT CHANGE UP (ref 43–77)
NON HDL CHOLESTEROL: 116 MG/DL — SIGNIFICANT CHANGE UP
NRBC # BLD: 0 /100 WBCS — SIGNIFICANT CHANGE UP (ref 0–0)
NRBC # FLD: 0 K/UL — SIGNIFICANT CHANGE UP (ref 0–0)
PHOSPHATE SERPL-MCNC: 3.6 MG/DL — SIGNIFICANT CHANGE UP (ref 2.5–4.5)
PLATELET # BLD AUTO: 124 K/UL — LOW (ref 150–400)
POTASSIUM SERPL-MCNC: 4.2 MMOL/L — SIGNIFICANT CHANGE UP (ref 3.5–5.3)
POTASSIUM SERPL-SCNC: 4.2 MMOL/L — SIGNIFICANT CHANGE UP (ref 3.5–5.3)
RBC # BLD: 5.08 M/UL — SIGNIFICANT CHANGE UP (ref 4.2–5.8)
RBC # FLD: 14.2 % — SIGNIFICANT CHANGE UP (ref 10.3–14.5)
SODIUM SERPL-SCNC: 142 MMOL/L — SIGNIFICANT CHANGE UP (ref 135–145)
SPECIMEN SOURCE: SIGNIFICANT CHANGE UP
TRIGL SERPL-MCNC: 67 MG/DL — SIGNIFICANT CHANGE UP
WBC # BLD: 5.69 K/UL — SIGNIFICANT CHANGE UP (ref 3.8–10.5)
WBC # FLD AUTO: 5.69 K/UL — SIGNIFICANT CHANGE UP (ref 3.8–10.5)

## 2022-11-13 PROCEDURE — 99232 SBSQ HOSP IP/OBS MODERATE 35: CPT

## 2022-11-13 RX ORDER — AMLODIPINE BESYLATE 2.5 MG/1
10 TABLET ORAL DAILY
Refills: 0 | Status: DISCONTINUED | OUTPATIENT
Start: 2022-11-13 | End: 2022-11-17

## 2022-11-13 RX ORDER — AMOXICILLIN 250 MG/5ML
500 SUSPENSION, RECONSTITUTED, ORAL (ML) ORAL EVERY 8 HOURS
Refills: 0 | Status: DISCONTINUED | OUTPATIENT
Start: 2022-11-13 | End: 2022-11-17

## 2022-11-13 RX ADMIN — Medication 2: at 18:04

## 2022-11-13 RX ADMIN — AMLODIPINE BESYLATE 5 MILLIGRAM(S): 2.5 TABLET ORAL at 05:31

## 2022-11-13 RX ADMIN — Medication 1: at 13:12

## 2022-11-13 RX ADMIN — ATORVASTATIN CALCIUM 80 MILLIGRAM(S): 80 TABLET, FILM COATED ORAL at 21:54

## 2022-11-13 RX ADMIN — Medication 500 MILLIGRAM(S): at 21:54

## 2022-11-13 RX ADMIN — ENOXAPARIN SODIUM 40 MILLIGRAM(S): 100 INJECTION SUBCUTANEOUS at 05:31

## 2022-11-13 RX ADMIN — Medication 81 MILLIGRAM(S): at 13:12

## 2022-11-13 RX ADMIN — AMLODIPINE BESYLATE 10 MILLIGRAM(S): 2.5 TABLET ORAL at 13:20

## 2022-11-13 NOTE — SWALLOW BEDSIDE ASSESSMENT ADULT - COMMENTS
Pt. seated upright in bed upon SLP arrival this afternoon. Pt.'s wife at bedside. Pt. AAOx3 (i.e. person, place, time) with increased response time noted.   Slight dysarthria noted.     Per chart:   "83yo M hx of HTN, DM, presents from home with LE weakness and a fall last week at home. CTH, c-spine, L-spine unremarkable w/o acute pathology. Pt admitted for further evaluation of LE weakness and PT eval."    -Lab review revealed WNL WBC  -No recent CXR available  -CTH revealed: "BRAIN CT : No intracranial hemorrhage. CERVICAL SPINE CT: No acute fracture or traumatic subluxation"    Neurology notes reviewed and appreciated.     Pt. and wife denied difficulty swallowing or change in swallow function since admission to hospital. Pt. reported tolerating a Regular diet with Thin liquids with no difficulties. Pt. and wife denied speech sounding dysarthric, however; reported their children noted "speech is slurred". Pt. able to follow 1-step commands and answer questions when prompted. Increased response time noted when pt. responding to questions.

## 2022-11-13 NOTE — PROGRESS NOTE ADULT - ASSESSMENT
83yo M hx of HTN, DM, presents from home with LE weakness and a fall last week at home. CTH, c-spine, L-spine unremarkable w/o acute pathology. Pt admitted for further evaluation of LE weakness and PT eval.

## 2022-11-13 NOTE — SWALLOW BEDSIDE ASSESSMENT ADULT - ADDITIONAL RECOMMENDATIONS
Pt. appeared safe and functional for Regular diet with Thin liquids at bedside. Instrumental study not indicated given no overt s/s of aspiration noted or pharyngeal dysphagia suspected. This service will f/u as scheduling permits x1 to ensure diet tolerance and no change in medical status.

## 2022-11-13 NOTE — SWALLOW BEDSIDE ASSESSMENT ADULT - ORAL PHASE
Call to patient, no answer, left detailed message explaining test results and recommendations    Stated to call back with questions or concerns   Within functional limits

## 2022-11-13 NOTE — SWALLOW BEDSIDE ASSESSMENT ADULT - SWALLOW EVAL: DIAGNOSIS
Pt. was given the following trials: Thin liquids, Puree, Soft and Bite Sized and Regular. 1. Functional oral stage of swallowing across trials marked by adequate bolus retrieval evident via complete oral grading/labial seal resulting in complete oral containment. Prompt swallow initiation with efficient bolus manipulation/formation noted. Rotary mastication patterns observed. Subsequent timely posterior transfer of bolus with complete oral clearance appreciated. 2. Functional pharyngeal stage of swallowing across trials marked by timely swallow trigger evident via hyolaryngeal elevation palpation. 1 swallow per bolus noted. No overt s/s of aspiration observed.

## 2022-11-14 LAB
ANION GAP SERPL CALC-SCNC: 12 MMOL/L — SIGNIFICANT CHANGE UP (ref 7–14)
BUN SERPL-MCNC: 39 MG/DL — HIGH (ref 7–23)
CALCIUM SERPL-MCNC: 9.6 MG/DL — SIGNIFICANT CHANGE UP (ref 8.4–10.5)
CHLORIDE SERPL-SCNC: 107 MMOL/L — SIGNIFICANT CHANGE UP (ref 98–107)
CO2 SERPL-SCNC: 23 MMOL/L — SIGNIFICANT CHANGE UP (ref 22–31)
CREAT SERPL-MCNC: 0.96 MG/DL — SIGNIFICANT CHANGE UP (ref 0.5–1.3)
EGFR: 79 ML/MIN/1.73M2 — SIGNIFICANT CHANGE UP
GLUCOSE BLDC GLUCOMTR-MCNC: 144 MG/DL — HIGH (ref 70–99)
GLUCOSE BLDC GLUCOMTR-MCNC: 155 MG/DL — HIGH (ref 70–99)
GLUCOSE BLDC GLUCOMTR-MCNC: 182 MG/DL — HIGH (ref 70–99)
GLUCOSE BLDC GLUCOMTR-MCNC: 193 MG/DL — HIGH (ref 70–99)
GLUCOSE BLDC GLUCOMTR-MCNC: 204 MG/DL — HIGH (ref 70–99)
GLUCOSE SERPL-MCNC: 153 MG/DL — HIGH (ref 70–99)
HCT VFR BLD CALC: 44.9 % — SIGNIFICANT CHANGE UP (ref 39–50)
HGB BLD-MCNC: 14.8 G/DL — SIGNIFICANT CHANGE UP (ref 13–17)
MAGNESIUM SERPL-MCNC: 1.9 MG/DL — SIGNIFICANT CHANGE UP (ref 1.6–2.6)
MCHC RBC-ENTMCNC: 28.6 PG — SIGNIFICANT CHANGE UP (ref 27–34)
MCHC RBC-ENTMCNC: 33 GM/DL — SIGNIFICANT CHANGE UP (ref 32–36)
MCV RBC AUTO: 86.8 FL — SIGNIFICANT CHANGE UP (ref 80–100)
NRBC # BLD: 0 /100 WBCS — SIGNIFICANT CHANGE UP (ref 0–0)
NRBC # FLD: 0 K/UL — SIGNIFICANT CHANGE UP (ref 0–0)
PHOSPHATE SERPL-MCNC: 3.3 MG/DL — SIGNIFICANT CHANGE UP (ref 2.5–4.5)
PLATELET # BLD AUTO: 117 K/UL — LOW (ref 150–400)
POTASSIUM SERPL-MCNC: 4.2 MMOL/L — SIGNIFICANT CHANGE UP (ref 3.5–5.3)
POTASSIUM SERPL-SCNC: 4.2 MMOL/L — SIGNIFICANT CHANGE UP (ref 3.5–5.3)
RBC # BLD: 5.17 M/UL — SIGNIFICANT CHANGE UP (ref 4.2–5.8)
RBC # FLD: 14.3 % — SIGNIFICANT CHANGE UP (ref 10.3–14.5)
SODIUM SERPL-SCNC: 142 MMOL/L — SIGNIFICANT CHANGE UP (ref 135–145)
WBC # BLD: 5.99 K/UL — SIGNIFICANT CHANGE UP (ref 3.8–10.5)
WBC # FLD AUTO: 5.99 K/UL — SIGNIFICANT CHANGE UP (ref 3.8–10.5)

## 2022-11-14 PROCEDURE — 93306 TTE W/DOPPLER COMPLETE: CPT | Mod: 26

## 2022-11-14 PROCEDURE — 99232 SBSQ HOSP IP/OBS MODERATE 35: CPT

## 2022-11-14 RX ORDER — SENNA PLUS 8.6 MG/1
2 TABLET ORAL AT BEDTIME
Refills: 0 | Status: DISCONTINUED | OUTPATIENT
Start: 2022-11-14 | End: 2022-11-17

## 2022-11-14 RX ORDER — POLYETHYLENE GLYCOL 3350 17 G/17G
17 POWDER, FOR SOLUTION ORAL DAILY
Refills: 0 | Status: DISCONTINUED | OUTPATIENT
Start: 2022-11-14 | End: 2022-11-14

## 2022-11-14 RX ORDER — POLYETHYLENE GLYCOL 3350 17 G/17G
17 POWDER, FOR SOLUTION ORAL DAILY
Refills: 0 | Status: DISCONTINUED | OUTPATIENT
Start: 2022-11-14 | End: 2022-11-17

## 2022-11-14 RX ADMIN — Medication 1: at 23:26

## 2022-11-14 RX ADMIN — SENNA PLUS 2 TABLET(S): 8.6 TABLET ORAL at 21:42

## 2022-11-14 RX ADMIN — Medication 1: at 12:55

## 2022-11-14 RX ADMIN — Medication 500 MILLIGRAM(S): at 21:42

## 2022-11-14 RX ADMIN — Medication 500 MILLIGRAM(S): at 12:57

## 2022-11-14 RX ADMIN — ENOXAPARIN SODIUM 40 MILLIGRAM(S): 100 INJECTION SUBCUTANEOUS at 05:29

## 2022-11-14 RX ADMIN — Medication 81 MILLIGRAM(S): at 12:56

## 2022-11-14 RX ADMIN — POLYETHYLENE GLYCOL 3350 17 GRAM(S): 17 POWDER, FOR SOLUTION ORAL at 12:56

## 2022-11-14 RX ADMIN — Medication 500 MILLIGRAM(S): at 05:30

## 2022-11-14 RX ADMIN — AMLODIPINE BESYLATE 10 MILLIGRAM(S): 2.5 TABLET ORAL at 12:57

## 2022-11-14 RX ADMIN — ATORVASTATIN CALCIUM 80 MILLIGRAM(S): 80 TABLET, FILM COATED ORAL at 21:42

## 2022-11-14 RX ADMIN — Medication 1: at 17:59

## 2022-11-14 NOTE — OCCUPATIONAL THERAPY INITIAL EVALUATION ADULT - PERTINENT HX OF CURRENT PROBLEM, REHAB EVAL
Pt is an 82 year old male with hx of HTN & DM, who presented to [facility] on 11/11/22 with Bilateral LE weakness and fall last week. BRAIN CT : No intracranial hemorrhage. CERVICAL SPINE CT: No acute fracture or traumatic subluxation. Pt admitted for further evaluation of LE weakness.

## 2022-11-14 NOTE — OCCUPATIONAL THERAPY INITIAL EVALUATION ADULT - GENERAL OBSERVATIONS, REHAB EVAL
Pt received semisupine in bed. No acute distress. Patient agreed to evaluation from Occupational Therapist. +Heplock, +Tele. Wife bedside.

## 2022-11-14 NOTE — OCCUPATIONAL THERAPY INITIAL EVALUATION ADULT - NSOTDISCHREC_GEN_A_CORE
Anticipate Home with no skilled OT services. Pt. may benefit from PT services. Pt reports his wife is available to assist as needed.

## 2022-11-14 NOTE — OCCUPATIONAL THERAPY INITIAL EVALUATION ADULT - PHYSICAL ASSIST/NONPHYSICAL ASSIST: GROOMING, OT EVAL
set-up required Doxepin Counseling:  Patient advised that the medication is sedating and not to drive a car after taking this medication. Patient informed of potential adverse effects including but not limited to dry mouth, urinary retention, and blurry vision.  The patient verbalized understanding of the proper use and possible adverse effects of doxepin.  All of the patient's questions and concerns were addressed.

## 2022-11-14 NOTE — OCCUPATIONAL THERAPY INITIAL EVALUATION ADULT - MD ORDER
Occupational Therapy (OT) to evaluate and treat. Increase as Tolerated. Per YARA Martinez, pt is okay to participate in OT evaluation and perform activity as tolerated.

## 2022-11-14 NOTE — OCCUPATIONAL THERAPY INITIAL EVALUATION ADULT - LIVES WITH, PROFILE
Pt. reports he lives with his wife in a house with no steps to enter. Once inside, pt. reports bedroom and bathroom are located on the main level. Per pt., he has a bathtub in his bathroom.

## 2022-11-15 LAB
ANION GAP SERPL CALC-SCNC: 11 MMOL/L — SIGNIFICANT CHANGE UP (ref 7–14)
BASOPHILS # BLD AUTO: 0.02 K/UL — SIGNIFICANT CHANGE UP (ref 0–0.2)
BASOPHILS NFR BLD AUTO: 0.3 % — SIGNIFICANT CHANGE UP (ref 0–2)
BUN SERPL-MCNC: 37 MG/DL — HIGH (ref 7–23)
CALCIUM SERPL-MCNC: 9.4 MG/DL — SIGNIFICANT CHANGE UP (ref 8.4–10.5)
CHLORIDE SERPL-SCNC: 105 MMOL/L — SIGNIFICANT CHANGE UP (ref 98–107)
CO2 SERPL-SCNC: 23 MMOL/L — SIGNIFICANT CHANGE UP (ref 22–31)
CREAT SERPL-MCNC: 0.88 MG/DL — SIGNIFICANT CHANGE UP (ref 0.5–1.3)
EGFR: 86 ML/MIN/1.73M2 — SIGNIFICANT CHANGE UP
EOSINOPHIL # BLD AUTO: 0.17 K/UL — SIGNIFICANT CHANGE UP (ref 0–0.5)
EOSINOPHIL NFR BLD AUTO: 2.9 % — SIGNIFICANT CHANGE UP (ref 0–6)
GLUCOSE BLDC GLUCOMTR-MCNC: 154 MG/DL — HIGH (ref 70–99)
GLUCOSE BLDC GLUCOMTR-MCNC: 188 MG/DL — HIGH (ref 70–99)
GLUCOSE BLDC GLUCOMTR-MCNC: 195 MG/DL — HIGH (ref 70–99)
GLUCOSE BLDC GLUCOMTR-MCNC: 230 MG/DL — HIGH (ref 70–99)
GLUCOSE BLDC GLUCOMTR-MCNC: 249 MG/DL — HIGH (ref 70–99)
GLUCOSE BLDC GLUCOMTR-MCNC: 304 MG/DL — HIGH (ref 70–99)
GLUCOSE SERPL-MCNC: 150 MG/DL — HIGH (ref 70–99)
HCT VFR BLD CALC: 43 % — SIGNIFICANT CHANGE UP (ref 39–50)
HGB BLD-MCNC: 14.6 G/DL — SIGNIFICANT CHANGE UP (ref 13–17)
IANC: 3.54 K/UL — SIGNIFICANT CHANGE UP (ref 1.8–7.4)
IMM GRANULOCYTES NFR BLD AUTO: 0.3 % — SIGNIFICANT CHANGE UP (ref 0–0.9)
LYMPHOCYTES # BLD AUTO: 1.62 K/UL — SIGNIFICANT CHANGE UP (ref 1–3.3)
LYMPHOCYTES # BLD AUTO: 27.3 % — SIGNIFICANT CHANGE UP (ref 13–44)
MAGNESIUM SERPL-MCNC: 1.8 MG/DL — SIGNIFICANT CHANGE UP (ref 1.6–2.6)
MCHC RBC-ENTMCNC: 28.9 PG — SIGNIFICANT CHANGE UP (ref 27–34)
MCHC RBC-ENTMCNC: 34 GM/DL — SIGNIFICANT CHANGE UP (ref 32–36)
MCV RBC AUTO: 85 FL — SIGNIFICANT CHANGE UP (ref 80–100)
MONOCYTES # BLD AUTO: 0.56 K/UL — SIGNIFICANT CHANGE UP (ref 0–0.9)
MONOCYTES NFR BLD AUTO: 9.4 % — SIGNIFICANT CHANGE UP (ref 2–14)
NEUTROPHILS # BLD AUTO: 3.54 K/UL — SIGNIFICANT CHANGE UP (ref 1.8–7.4)
NEUTROPHILS NFR BLD AUTO: 59.8 % — SIGNIFICANT CHANGE UP (ref 43–77)
NRBC # BLD: 0 /100 WBCS — SIGNIFICANT CHANGE UP (ref 0–0)
NRBC # FLD: 0 K/UL — SIGNIFICANT CHANGE UP (ref 0–0)
PHOSPHATE SERPL-MCNC: 3.4 MG/DL — SIGNIFICANT CHANGE UP (ref 2.5–4.5)
PLATELET # BLD AUTO: 120 K/UL — LOW (ref 150–400)
POTASSIUM SERPL-MCNC: 3.9 MMOL/L — SIGNIFICANT CHANGE UP (ref 3.5–5.3)
POTASSIUM SERPL-SCNC: 3.9 MMOL/L — SIGNIFICANT CHANGE UP (ref 3.5–5.3)
RBC # BLD: 5.06 M/UL — SIGNIFICANT CHANGE UP (ref 4.2–5.8)
RBC # FLD: 14.2 % — SIGNIFICANT CHANGE UP (ref 10.3–14.5)
SODIUM SERPL-SCNC: 139 MMOL/L — SIGNIFICANT CHANGE UP (ref 135–145)
WBC # BLD: 5.93 K/UL — SIGNIFICANT CHANGE UP (ref 3.8–10.5)
WBC # FLD AUTO: 5.93 K/UL — SIGNIFICANT CHANGE UP (ref 3.8–10.5)

## 2022-11-15 PROCEDURE — 99232 SBSQ HOSP IP/OBS MODERATE 35: CPT

## 2022-11-15 PROCEDURE — 70548 MR ANGIOGRAPHY NECK W/DYE: CPT | Mod: 26

## 2022-11-15 PROCEDURE — 70551 MRI BRAIN STEM W/O DYE: CPT | Mod: 26

## 2022-11-15 PROCEDURE — 72158 MRI LUMBAR SPINE W/O & W/DYE: CPT | Mod: 26

## 2022-11-15 RX ORDER — LACTULOSE 10 G/15ML
10 SOLUTION ORAL ONCE
Refills: 0 | Status: DISCONTINUED | OUTPATIENT
Start: 2022-11-15 | End: 2022-11-15

## 2022-11-15 RX ADMIN — SENNA PLUS 2 TABLET(S): 8.6 TABLET ORAL at 21:21

## 2022-11-15 RX ADMIN — Medication 81 MILLIGRAM(S): at 11:29

## 2022-11-15 RX ADMIN — Medication 1: at 22:53

## 2022-11-15 RX ADMIN — Medication 1: at 18:10

## 2022-11-15 RX ADMIN — Medication 2: at 13:10

## 2022-11-15 RX ADMIN — Medication 500 MILLIGRAM(S): at 21:21

## 2022-11-15 RX ADMIN — ATORVASTATIN CALCIUM 80 MILLIGRAM(S): 80 TABLET, FILM COATED ORAL at 21:22

## 2022-11-15 RX ADMIN — Medication 1: at 09:14

## 2022-11-15 RX ADMIN — Medication 500 MILLIGRAM(S): at 05:27

## 2022-11-15 RX ADMIN — POLYETHYLENE GLYCOL 3350 17 GRAM(S): 17 POWDER, FOR SOLUTION ORAL at 11:29

## 2022-11-15 RX ADMIN — Medication 500 MILLIGRAM(S): at 13:10

## 2022-11-15 RX ADMIN — ENOXAPARIN SODIUM 40 MILLIGRAM(S): 100 INJECTION SUBCUTANEOUS at 05:26

## 2022-11-15 RX ADMIN — AMLODIPINE BESYLATE 10 MILLIGRAM(S): 2.5 TABLET ORAL at 05:27

## 2022-11-16 ENCOUNTER — TRANSCRIPTION ENCOUNTER (OUTPATIENT)
Age: 82
End: 2022-11-16

## 2022-11-16 LAB
GLUCOSE BLDC GLUCOMTR-MCNC: 159 MG/DL — HIGH (ref 70–99)
GLUCOSE BLDC GLUCOMTR-MCNC: 172 MG/DL — HIGH (ref 70–99)
GLUCOSE BLDC GLUCOMTR-MCNC: 185 MG/DL — HIGH (ref 70–99)
GLUCOSE BLDC GLUCOMTR-MCNC: 195 MG/DL — HIGH (ref 70–99)
GLUCOSE BLDC GLUCOMTR-MCNC: 216 MG/DL — HIGH (ref 70–99)
SARS-COV-2 RNA SPEC QL NAA+PROBE: SIGNIFICANT CHANGE UP

## 2022-11-16 PROCEDURE — 99233 SBSQ HOSP IP/OBS HIGH 50: CPT

## 2022-11-16 PROCEDURE — 99233 SBSQ HOSP IP/OBS HIGH 50: CPT | Mod: FS

## 2022-11-16 PROCEDURE — 99232 SBSQ HOSP IP/OBS MODERATE 35: CPT

## 2022-11-16 RX ORDER — AMLODIPINE BESYLATE 2.5 MG/1
1 TABLET ORAL
Qty: 0 | Refills: 0 | DISCHARGE

## 2022-11-16 RX ORDER — CLOPIDOGREL BISULFATE 75 MG/1
75 TABLET, FILM COATED ORAL DAILY
Refills: 0 | Status: DISCONTINUED | OUTPATIENT
Start: 2022-11-16 | End: 2022-11-17

## 2022-11-16 RX ADMIN — Medication 500 MILLIGRAM(S): at 13:19

## 2022-11-16 RX ADMIN — Medication 500 MILLIGRAM(S): at 05:29

## 2022-11-16 RX ADMIN — Medication 81 MILLIGRAM(S): at 11:38

## 2022-11-16 RX ADMIN — AMLODIPINE BESYLATE 10 MILLIGRAM(S): 2.5 TABLET ORAL at 09:05

## 2022-11-16 RX ADMIN — Medication 500 MILLIGRAM(S): at 21:23

## 2022-11-16 RX ADMIN — Medication 1: at 18:19

## 2022-11-16 RX ADMIN — CLOPIDOGREL BISULFATE 75 MILLIGRAM(S): 75 TABLET, FILM COATED ORAL at 16:40

## 2022-11-16 RX ADMIN — Medication 1: at 09:03

## 2022-11-16 RX ADMIN — Medication 1: at 13:16

## 2022-11-16 RX ADMIN — ATORVASTATIN CALCIUM 80 MILLIGRAM(S): 80 TABLET, FILM COATED ORAL at 21:23

## 2022-11-16 RX ADMIN — POLYETHYLENE GLYCOL 3350 17 GRAM(S): 17 POWDER, FOR SOLUTION ORAL at 11:38

## 2022-11-16 RX ADMIN — Medication 2: at 21:29

## 2022-11-16 RX ADMIN — SENNA PLUS 2 TABLET(S): 8.6 TABLET ORAL at 21:22

## 2022-11-16 RX ADMIN — ENOXAPARIN SODIUM 40 MILLIGRAM(S): 100 INJECTION SUBCUTANEOUS at 05:29

## 2022-11-16 NOTE — CONSULT NOTE ADULT - SUBJECTIVE AND OBJECTIVE BOX
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 Glaucoma as well..  Allergic conjunctivitis worse in summer, uses Pataday  Taking AREDS and using Amsler  No h/o smoking    Prefers attending injection    PAST OCULAR SURGERY:   CE/IOL OS 9/27/20  CE/IOL OD 10/19/20   YAG OD 2/9/21  YAG OS 3/9/21    RETINAL IMAGING  OCT 05/10/22   OD: few small drusen superior to fovea; no fluid, PVD - stable  OS large FVPED stable, 2+ SRF & SR material, SRF inferior to fovea PVD - - stable    FA  11-17-20  OD - normal filling, staining of drusen, no leakage  OS - (transit) normal filling, staining of drusen/filling of PED, ?mild leakage    ICG 11-17-20  OD - normal  OS - (transit) CNVM, ?polyp on late (poor quality image d/t vein bursting)    ASSESSMENT & PLAN    #.  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   - h/o longstanding  very subtle SRF, had slowly progressed since 2015   - new distortion OS noted 7/2016, started Avastin   - OCT-A 2/2019 shows CNVM OS   - last Avastin (#37) 9/22/2020 , changed to Eylea 10/2020   - new SRH 7/14/20 2 weeks after injection with large FVPED   - VA worse after CE/IOL ?etiology   - ?PCV on FA/ICG 11/2020     - given large FVPED & fair vision hold PDT d/t risk of RPE rip   - FA/ICG 11/2020 poor quality, consider repeating in future   - mild DBH 11/17/20 gone 2/2021     - last injection Eylea (#20) 4/12/22  (4 weeks)   - prior DFE no heme   - OCT stable SRF   - VA stable today   - inject Eylea   - RTC 4 weeks          #. 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  Posterior vitreous detachment (PVD) both eyes   - Advised S/Sx RD 2/2022    #. H/o Allergic conjunctivitis, OS   -chronic symptoms both eyes, typically worse in summer   -was been using Pataday qdaily both eyes   -  now uses FML daily   - now using  artificial tears well controlled (2/2022)    #. OHT OU   - IOP 27 on 9/11/18   - mild increased CDR   - IOP OK today   - Following with Dr. Bennett; CPM      RTC  4 weeks, no dilation OCT OU, Eylea OS        ATTESTATION     Attending Physician Attestation:      Complete documentation of historical and exam elements from today's encounter can be found in the full encounter summary report (not reduplicated in this progress note).  I personally obtained the chief complaint(s) and history of present illness.  I confirmed and edited as necessary the review of systems, past medical/surgical history, family history, social history, and examination findings as documented by others; and I examined the patient myself.  I personally reviewed the relevant tests, images, and reports as documented above.  I formulated and edited as necessary the assessment and plan and discussed the findings and management plan with the patient and family    Crystal Hinojosa MD, PhD  , Vitreoretinal Surgery  Department of Ophthalmology  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  EPS consulted for ILR evaluation.  As per patient and wife at bedside, no cardiac history or history of arrhthymias, chest pain, palpitations, lightheadedness, dizziness.  On telemetry and EKG NSR with occasional PVCs.      Allergies  No Known Allergies  Intolerances    MEDICATIONS:  amLODIPine   Tablet 10 milliGRAM(s) Oral daily  aspirin  chewable 81 milliGRAM(s) Oral daily  enoxaparin Injectable 40 milliGRAM(s) SubCutaneous every 24 hours  amoxicillin 500 milliGRAM(s) Oral every 8 hours  acetaminophen     Tablet .. 650 milliGRAM(s) Oral every 6 hours PRN  polyethylene glycol 3350 17 Gram(s) Oral daily  senna 2 Tablet(s) Oral at bedtime  atorvastatin 80 milliGRAM(s) Oral at bedtime  dextrose 50% Injectable 25 Gram(s) IV Push once  dextrose Oral Gel 15 Gram(s) Oral once PRN  glucagon  Injectable 1 milliGRAM(s) IntraMuscular once  insulin lispro (ADMELOG) corrective regimen sliding scale   SubCutaneous Before meals and at bedtime  coronavirus bivalent (EUA) Booster Vaccine (PFIZER) 0.3 milliLiter(s) IntraMuscular once  dextrose 5%. 1000 milliLiter(s) IV Continuous <Continuous>    PAST MEDICAL & SURGICAL HISTORY:  HTN (hypertension)  DM (diabetes mellitus)  H/O hernia repair    FAMILY HISTORY:  FH: ALS (amyotrophic lateral sclerosis) (Sibling)  FH: HTN (hypertension) (Mother)    REVIEW OF SYSTEMS:  See HPI. Otherwise, 10 point ROS done and otherwise negative.    PHYSICAL EXAM:  T(C): 36.7 (11-16-22 @ 09:00), Max: 37 (11-16-22 @ 05:29)  HR: 67 (11-16-22 @ 09:00) (58 - 67)  BP: 149/86 (11-16-22 @ 09:00) (132/74 - 156/83)  RR: 18 (11-16-22 @ 09:00) (17 - 18)  SpO2: 98% (11-16-22 @ 09:00) (98% - 100%)  Wt(kg): --  I&O's Summary    Appearance: Normal	  HEENT:   Normal oral mucosa, PERRL, EOMI	  Lymphatic: No lymphadenopathy  Cardiovascular: Normal S1 S2, No JVD, No murmurs, No edema  Respiratory: Lungs clear to auscultation	  Psychiatry: A & O x 3, Mood & affect appropriate  Gastrointestinal:  Soft, Non-tender, + BS	  Skin: No rashes, No ecchymoses, No cyanosis	  Neurologic: Non-focal  Extremities: Normal range of motion, No clubbing, cyanosis or edema  Vascular: Peripheral pulses palpable 2+ bilaterally    LABS:	 	  CBC Full  -  ( 15 Nov 2022 05:26 )  WBC Count : 5.93 K/uL  Hemoglobin : 14.6 g/dL  Hematocrit : 43.0 %  Platelet Count - Automated : 120 K/uL  Mean Cell Volume : 85.0 fL  Mean Cell Hemoglobin : 28.9 pg  Mean Cell Hemoglobin Concentration : 34.0 gm/dL  Auto Neutrophil # : 3.54 K/uL  Auto Lymphocyte # : 1.62 K/uL  Auto Monocyte # : 0.56 K/uL  Auto Eosinophil # : 0.17 K/uL  Auto Basophil # : 0.02 K/uL  Auto Neutrophil % : 59.8 %  Auto Lymphocyte % : 27.3 %  Auto Monocyte % : 9.4 %  Auto Eosinophil % : 2.9 %  Auto Basophil % : 0.3 %    11-15    139  |  105  |  37<H>  ----------------------------<  150<H>  3.9   |  23  |  0.88    Ca    9.4      15 Nov 2022 05:26  Phos  3.4     11-15  Mg     1.80     11-15

## 2022-11-16 NOTE — DISCHARGE NOTE PROVIDER - NSDCMRMEDTOKEN_GEN_ALL_CORE_FT
Jardiance 10 mg oral tablet: 1 tab(s) orally once a day (in the morning)  metFORMIN 850 mg oral tablet: 1 tab(s) orally once a day (in the morning)   amLODIPine 10 mg oral tablet: 1 tab(s) orally once a day  aspirin 81 mg oral tablet, chewable: 1 tab(s) orally once a day  atorvastatin 80 mg oral tablet: 1 tab(s) orally once a day (at bedtime)  clopidogrel 75 mg oral tablet: 1 tab(s) orally once a day x 20 days then continue Aspirin alone  Jardiance 10 mg oral tablet: 1 tab(s) orally once a day (in the morning)  metFORMIN 850 mg oral tablet: 1 tab(s) orally once a day (in the morning)  polyethylene glycol 3350 oral powder for reconstitution: 17 gram(s) orally once a day, As Needed - for constipation  senna leaf extract oral tablet: 2 tab(s) orally once a day (at bedtime), As Needed - for constipation

## 2022-11-16 NOTE — MEDICAL STUDENT PROGRESS NOTE(EDUCATION) - SUBJECTIVE AND OBJECTIVE BOX
Neurology Progress Note    SUBJECTIVE/OBJECTIVE/INTERVAL EVENTS: Patient seen and examined at bedside w/ neuro attending and team.     INTERVAL HISTORY: No acute events overnight. Pt states he feels well. Discussed loop recorder with pt and family and they were amenable.    ROS: No vision changes, no focal weakness/numbness.     VITALS & EXAMINATION:  Vital Signs Last 24 Hrs  T(C): 36.7 (16 Nov 2022 09:00), Max: 37 (16 Nov 2022 05:29)  T(F): 98.1 (16 Nov 2022 09:00), Max: 98.6 (16 Nov 2022 05:29)  HR: 67 (16 Nov 2022 09:00) (58 - 67)  BP: 149/86 (16 Nov 2022 09:00) (132/74 - 156/83)  BP(mean): --  RR: 18 (16 Nov 2022 09:00) (17 - 18)  SpO2: 98% (16 Nov 2022 09:00) (98% - 100%)    Parameters below as of 16 Nov 2022 09:00  Patient On (Oxygen Delivery Method): room air      General:   Constitutional: Male, appears stated age   Head: Normocephalic; Eyes: clear sclera;   Extremities: No cyanosis; Skin: No humphrey edema of LE  Resp: breathing comfortably     Neurological (>12):  MS: Eyes open, alert. Follows commands. Attends to examiner.  Language: Speech is normal, clear, fluent.  CNs: VFF. EOMI. Slight R facial droop. Hearing grossly normal b/l.      Motor -     L/R         Deltoid  5/5    Biceps   5/5      Triceps  5/5     Wrist Extension 5/5   Wrist Flexion  5/5      L/R         Hip Flexion  4/5    Hip Extension  5/5  Dorsiflexion  5/5    Plantar Flexion 5/5     Sensation: Intact to LT b/l without extinction.  Reflexes: Frontal release signs absent, including absent glabellar tap, snout, palmomental, and grasp reflexes.  Coordination: No dysmetria to FTN b/l UE.    LABORATORY:  CBC                       14.6   5.93  )-----------( 120      ( 15 Nov 2022 05:26 )             43.0     Chem 11-15    139  |  105  |  37<H>  ----------------------------<  150<H>  3.9   |  23  |  0.88    Ca    9.4      15 Nov 2022 05:26  Phos  3.4     11-15  Mg     1.80     11-15    Lipid Panel 11-13 Chol 167 LDL -- HDL 51 Trig 67    < from: MR Head No Cont (11.15.22 @ 21:21) >  FINDINGS:    BRAIN:   The brain demonstrates a focal lesion within the deep white   matter of the left frontal lobe consistent with acute infarction. This   involves the left genu of the corpus callosum extending anterior to   posterior within the left callosal body and the pericallosal white   matter. Small noncontiguous foci present in the adjacent deep white   matter and within the posterior body and splenium of corpus callosum.   This acute infarct demonstrates diffusion restriction. This infarct is   brightly hyperintense on diffusion weighted images, intermediate   hyperintense on the long TR images, low signal intensity on ADC images   and is largely inconspicuous on the remaining pulse sequences.    Remote infarctions also present in each cerebellar hemisphere more   conspicuous on the left than the right.   No abnormal enhancement is   found in the brain.  No acute cerebral cortical infarct is found.   No   intracranial hemorrhage is recognized.  No mass effect is found in the   brain.   The brain also demonstrates a few additional small indistinct   lesions within the cerebral hemispheric white matter that suggest   ischemic white matter disease. These lesions are hyperintense on the long   TR images, otherwise inconspicuous. Lesions are scattered within the   cerebral hemispheric white matter.    CSF SPACES:   The ventricles, sulci and basal cisterns  appear mildly   dilated reflecting diffuse brain volume loss.   Tiny subcortical vertex   and forebrain Virchow Ramírez spaces are noted.    VESSELS:   The vertebral and internal carotid arteries demonstrate   expected flow voids indicating their patency. The left vertebral artery   is dominant caliber    HEAD AND NECK STRUCTURES:   The orbits are unremarkable.  Paranasal   sinuses are clear.  The nasal cavity appears intact.  The central skull   base appears intact.  The nasopharynx is symmetric.  The temporal bones   appear clear of disease.  The calvarium appears unremarkable.      IMPRESSION:    1. 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 Cerebellar remote deep infarctions      < end of copied text >     Neurology Progress Note    SUBJECTIVE/OBJECTIVE/INTERVAL EVENTS: Patient seen and examined at bedside w/ neuro attending and team.     INTERVAL HISTORY: No acute events overnight. Pt states he feels well. Discussed loop recorder with pt and family and they were amenable.    ROS: No vision changes, no focal weakness/numbness.     VITALS & EXAMINATION:  Vital Signs Last 24 Hrs  T(C): 36.7 (16 Nov 2022 09:00), Max: 37 (16 Nov 2022 05:29)  T(F): 98.1 (16 Nov 2022 09:00), Max: 98.6 (16 Nov 2022 05:29)  HR: 67 (16 Nov 2022 09:00) (58 - 67)  BP: 149/86 (16 Nov 2022 09:00) (132/74 - 156/83)  BP(mean): --  RR: 18 (16 Nov 2022 09:00) (17 - 18)  SpO2: 98% (16 Nov 2022 09:00) (98% - 100%)    Parameters below as of 16 Nov 2022 09:00  Patient On (Oxygen Delivery Method): room air    Constitutional: Male, appears stated age   Head: Normocephalic; Eyes: clear sclera;   Resp: breathing comfortably     Neurological (>12):  MS: Eyes open, alert. Follows commands. Attends to examiner.  Language: Speech is normal, clear, fluent.  CNs: VFF. EOMI. Slight R facial droop. Hearing grossly normal b/l.      Motor - Normal bulk and tone throughout. Mild R leg drift but does not fall to bed.     L/R         Deltoid  5/5    Biceps   5/5      Triceps  5/5     Wrist Extension 5/5   Wrist Flexion  5/5      L/R         Hip Flexion  4/5    Hip Extension  5/5  Dorsiflexion  5/5    Plantar Flexion 5/5     Sensation: Intact to LT b/l without extinction.  Reflexes: Frontal release signs absent, including absent glabellar tap, snout, palmomental, and grasp reflexes.  Coordination: No dysmetria to FTN b/l UE.    LABORATORY:  CBC                       14.6   5.93  )-----------( 120      ( 15 Nov 2022 05:26 )             43.0     Chem 11-15    139  |  105  |  37<H>  ----------------------------<  150<H>  3.9   |  23  |  0.88    Ca    9.4      15 Nov 2022 05:26  Phos  3.4     11-15  Mg     1.80     11-15    Lipid Panel 11-13 Chol 167 LDL -- HDL 51 Trig 67    < from: MR Head No Cont (11.15.22 @ 21:21) >  FINDINGS:    BRAIN:   The brain demonstrates a focal lesion within the deep white   matter of the left frontal lobe consistent with acute infarction. This   involves the left genu of the corpus callosum extending anterior to   posterior within the left callosal body and the pericallosal white   matter. Small noncontiguous foci present in the adjacent deep white   matter and within the posterior body and splenium of corpus callosum.   This acute infarct demonstrates diffusion restriction. This infarct is   brightly hyperintense on diffusion weighted images, intermediate   hyperintense on the long TR images, low signal intensity on ADC images   and is largely inconspicuous on the remaining pulse sequences.    Remote infarctions also present in each cerebellar hemisphere more   conspicuous on the left than the right.   No abnormal enhancement is   found in the brain.  No acute cerebral cortical infarct is found.   No   intracranial hemorrhage is recognized.  No mass effect is found in the   brain.   The brain also demonstrates a few additional small indistinct   lesions within the cerebral hemispheric white matter that suggest   ischemic white matter disease. These lesions are hyperintense on the long   TR images, otherwise inconspicuous. Lesions are scattered within the   cerebral hemispheric white matter.    CSF SPACES:   The ventricles, sulci and basal cisterns  appear mildly   dilated reflecting diffuse brain volume loss.   Tiny subcortical vertex   and forebrain Virchow Ramírez spaces are noted.    VESSELS:   The vertebral and internal carotid arteries demonstrate   expected flow voids indicating their patency. The left vertebral artery   is dominant caliber    HEAD AND NECK STRUCTURES:   The orbits are unremarkable.  Paranasal   sinuses are clear.  The nasal cavity appears intact.  The central skull   base appears intact.  The nasopharynx is symmetric.  The temporal bones   appear clear of disease.  The calvarium appears unremarkable.      IMPRESSION:    1. 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 Cerebellar remote deep infarctions      < end of copied text >

## 2022-11-16 NOTE — PROGRESS NOTE ADULT - ASSESSMENT
81yo M hx of HTN, DM, presents from home with weakness and a fall last week at home. Wife at bedside reports he was getting up from sitting after putting on his shoes when he fell over. Patient reports he went down hard but did not hit head or lose consciousness. Patient reports feeling unbalanced but not dizzy. Weakness in both legs has started since then and has progressively worsened over the past week. Patient states he started walking with a cane since the fall on November 3rd. He says he can walk but he feels like he will fall. Per primary team, MRA as an outpatient was apparently not significant for stroke but showed atherosclerosis. Patient says he started physical therapy in July for overall health. He reports the past week he has pain in both hamstrings that he describes as "an alligator bit a hole in the back of his legs". Described as sharp, throbbing, and constant. Walking or using his legs does not make the pain worse. He thinks it is related to PT and the weakness and pain are inter-related. He saw his PCP for posterior thigh pain but said nothing was wrong. Denies rash in the area. Denies pain radiating from back to legs or pain radiating down legs from thighs. The pain is only located in the hamstring region. Patient denies vision changes, numbness, tingling, burning, tinnitus, headache, n/v, fever, chills, weight loss or gain without trying, back pain. Endorses urinary frequency but wife says that has been ongoing for quite some time. CT head c- and l-spine unremarkable. NIHSS 1.     Impression: RLE weakness 2/2 small to medium acute Left ROSA infarct due to acute ischemic stroke. Mechanism likely embolic stroke of undetermined source.    RECOMMENDATIONS:  [] Continue with ASA 81 mg daily.  [] Start Plavix 75mg daily x3 weeks and then continue w/ ASA only per CHANCE trial  [] Continue with Atorvastatin 80 mg daily (LDL goal <70)  [] Home PT per PT/OT eval.   [] Loop recorder placement per EP tomorrow, 11/17/22  [] Patient should follow up with Stroke NP, Priti Boudreaux or Angelina Gleason, in clinic at [address] [place] [address]. Please email [email or web address] w/ basic PHI. ([phone])  [] Cleared for discharge from neurovascular standpoint after loop recorder placement      Case seen and d/w Neurovascular attending.  83yo M hx of HTN, DM, presents from home with weakness and a fall last week at home. Wife at bedside reports he was getting up from sitting after putting on his shoes when he fell over. Patient reports he went down hard but did not hit head or lose consciousness. Patient reports feeling unbalanced but not dizzy. Weakness in both legs has started since then and has progressively worsened over the past week. Patient states he started walking with a cane since the fall on November 3rd. He says he can walk but he feels like he will fall. Per primary team, MRA as an outpatient was apparently not significant for stroke but showed atherosclerosis. Patient says he started physical therapy in July for overall health. He reports the past week he has pain in both hamstrings that he describes as "an alligator bit a hole in the back of his legs". Described as sharp, throbbing, and constant. Walking or using his legs does not make the pain worse. He thinks it is related to PT and the weakness and pain are inter-related. He saw his PCP for posterior thigh pain but said nothing was wrong. Denies rash in the area. Denies pain radiating from back to legs or pain radiating down legs from thighs. The pain is only located in the hamstring region. Patient denies vision changes, numbness, tingling, burning, tinnitus, headache, n/v, fever, chills, weight loss or gain without trying, back pain. Endorses urinary frequency but wife says that has been ongoing for quite some time. CT head c- and l-spine unremarkable. NIHSS 1.     Impression: RLE weakness 2/2 small to medium sized acute Left ROSA infarct due to acute ischemic stroke. Mechanism likely embolic stroke of undetermined source.    RECOMMENDATIONS:  [] Continue with ASA 81 mg daily.  [] Start Plavix 75mg daily x3 weeks and then continue w/ ASA only per CHANCE trial  [] Continue with Atorvastatin 80 mg daily (LDL goal <70)  [] Home PT per PT/OT eval.   [] Loop recorder placement per EP tomorrow, 11/17/22  [] Patient should follow up with Stroke NP, Priti Boudreaux or Angelina Gleason, in clinic at [address] [place] [address]. Please email [email or web address] w/ basic PHI. ([phone])  [] Cleared for discharge from neurovascular standpoint       Case seen and d/w Neurovascular attending.

## 2022-11-16 NOTE — DISCHARGE NOTE PROVIDER - NSDCCPCAREPLAN_GEN_ALL_CORE_FT
PRINCIPAL DISCHARGE DIAGNOSIS  Diagnosis: Right leg weakness  Assessment and Plan of Treatment:        PRINCIPAL DISCHARGE DIAGNOSIS  Diagnosis: CVA (cerebrovascular accident)  Assessment and Plan of Treatment: Follow up with your neurologist, RODRIGO Boudreaux in 1-2 weeks. Please call for an appointment. Take Aspirin and Plavix for total of 3 weeks, then continue just aspirin alone. Ensure compliance with your medications as directed for high blood pressure and diabetes to reduce your risk of   You had a loop recorder implanted to monitor your heart rhythm and rule out heart arrythmia (Afib)  as a potention cause of stroke. Follow up with your electrophysiologist for ILR check on 12/7/22 @ 12:40pm.      SECONDARY DISCHARGE DIAGNOSES  Diagnosis: DM (diabetes mellitus)  Assessment and Plan of Treatment: Ensure compliance with your medications at home to control blood sugar. Follow up with your primary care physician for further monitoring in 1-2 weeks. Please call to arrange appointment.   Continue diet modification. Avoid complex carbohydrates such as bread, pasta, cereal, white rice, white potatoes, etc. Avoid concentrated sugar as found in desserts, candy, soda, juice, etc. Consume a diet based on lean protein (chicken, fish) and vegetables.    Diagnosis: Thrombocytopenia  Assessment and Plan of Treatment: Follow up with your primary care physician for further monitoring in 1-2 weeks. Please call to arrange appointment. Your PCP may refer to a hemaotlogist for further work up.    Diagnosis: HTN (hypertension)  Assessment and Plan of Treatment: Ensure compliance with your medications at home. Follow up with your primary care physician for further monitoring in 1-2 weeks. Please call to arrange appointment. Low cholesterol diet. Salt restriction. 1800Kcal diabetic diet with carb restriction.

## 2022-11-16 NOTE — DISCHARGE NOTE PROVIDER - CARE PROVIDERS DIRECT ADDRESSES
[email or web address],DirectAddress_Unknown ,DirectAddress_Unknown,DirectAddress_Unknown

## 2022-11-16 NOTE — DISCHARGE NOTE PROVIDER - NSDCFUADDAPPT_GEN_ALL_CORE_FT
Follow up with Stroke NP, Priti Boudreaux or Angelina Gleason, in clinic at [address] [place] [address]. ([phone])   Follow up with Stroke NP, Priti Boudreaux or Angelina Gleason, in clinic at [address] [place] [address]. ([phone])    EP follow up for ILR check (loop recorder) on 12/7 @ 12:40pm  [phone]

## 2022-11-16 NOTE — MEDICAL STUDENT PROGRESS NOTE(EDUCATION) - NS MD HP STUD ASPLAN PLAN FT
Continue ASA 81 mg daily.     Cleared for discharge from neurovascular standpoint after loop recorder placement.  Continue ASA 81 mg daily.   Atorvastatin 80 mg daily.  Home PT per PT/OT eval.   Loop recorder placement.   Cleared for discharge from neurovascular standpoint after loop recorder placement.  RECOMMENDATIONS:  [] Continue with ASA 81 mg daily.  [] Start Plavix 75mg daily x3 weeks and then continue w/ ASA only per CHANCE trial  [] Continue with Atorvastatin 80 mg daily (LDL goal <70)  [] Home PT per PT/OT eval.   [] Loop recorder placement per EP tomorrow, 11/17/22  [] Patient should follow up with Stroke NP, Priti Boudreaux or Angelina Gleason, in clinic at [address] [place] [address]. Please email [email or web address] w/ basic PHI. ([phone])  [] Cleared for discharge from neurovascular standpoint after loop recorder placement      Case seen and d/w Neurovascular attending.

## 2022-11-16 NOTE — DISCHARGE NOTE PROVIDER - HOSPITAL COURSE
81yo M hx of HTN, DM, presents from home with RLE > LLE weakness and a fall last week at home. Admitted to r/o CVA. MR Head with left genu and body corpus callosum acute infarction with remote deep infarctions on the cerebellum. TTE with grade 1 diastolic dysfunction. PT recommends home PT with rolling walker. Neuro ________________. MR lumbar spine performed to r/o cord compression given asymmetry of LE weakness. MRI L spine with low grade lumbar DJD with foraminal stenosis L>R at L4-L5 and L5-S1.      83yo M hx of HTN, DM, presents from home with RLE > LLE weakness and a fall last week at home. Admitted to r/o CVA. MR Head with left genu and body corpus callosum acute infarction with remote deep infarctions on the cerebellum. TTE with grade 1 diastolic dysfunction. PT recommends home PT with rolling walker. Neuro ________________. MR lumbar spine performed to r/o cord compression given asymmetry of LE weakness. MRI L spine with low grade lumbar DJD with foraminal stenosis L>R at L4-L5 and L5-S1.      81yo M hx of HTN, DM, presents from home with RLE > LLE weakness and a fall last week at home. Admitted to r/o CVA. MR Head with left genu and body corpus callosum acute infarction with remote deep infarctions on the cerebellum. TTE with grade 1 diastolic dysfunction. PT recommends home PT with rolling walker. MR lumbar spine performed to r/o cord compression given asymmetry of LE weakness. MRI L spine with low grade lumbar DJD with foraminal stenosis L>R at L4-L5 and L5-S1. Neurology following, recommend continue with ASA 81 mg daily. Start Plavix 75mg daily x3 weeks and then continue w/ ASA only per CHANCE trial. Pt underwent ILR placement on 11/17.     Case discussed with Dr. Boothe, labs/vitals/medications reviewed, Pt medically cleared for discharge to home with home PT and rolling walker and outpt follow up as directed.

## 2022-11-16 NOTE — DISCHARGE NOTE PROVIDER - PROVIDER TOKENS
PROVIDER:[TOKEN:[[id number]]] PROVIDER:[TOKEN:[[id number]]],PROVIDER:[TOKEN:[[id number]]] PROVIDER:[TOKEN:[[id number]]],PROVIDER:[TOKEN:[[id number]]]

## 2022-11-16 NOTE — DISCHARGE NOTE PROVIDER - CARE PROVIDER_API CALL
Nettie Bennett)  Clinical Neurophysiology; Electrodiagnostic Medicine; Neurology  [address] [place] [address]  Phone: [phone]  Fax: [phone]  Follow Up Time:    Nettie Bennett)  Clinical Neurophysiology; Electrodiagnostic Medicine; Neurology  [address] [facility], [address]  [place] [address]  Phone: [phone]  Fax: [phone]  Follow Up Time:     Priti Boudreaux (NP; RN)  NP in [facility]  [address] [place] [address] [address]  [place] [address]  Phone: [phone]  Fax: [phone]  Follow Up Time:    Priti Boudreaux (NP; RN)  NP in Family [facility]  [address] [facility], [address]  [place] [address]  Phone: [phone]  Fax: [phone]  Follow Up Time:     Curt Blackburn)  Cardiovascular Disease; Internal Medicine  [address] [place] [address]  Phone: [phone]  Fax: [phone]  Follow Up Time:

## 2022-11-16 NOTE — CONSULT NOTE ADULT - ASSESSMENT
82yoM w/ PMHx HTN, DM initially presented with RLE weakness found to have MR head with left genu and body corpus callosum acute infarction with remote deep infarctions on the cerebellum.  EPS consulted for ILR evaluation.  As per patient and wife at bedside, no cardiac history or history of arrhthymias, chest pain, palpitations, lightheadedness, dizziness.  On telemetry and EKG NSR with occasional PVCs.      ILR evaluation  Discussed with patient and his wife about an ILR, reviewed risks and benefits; all questions were answered   Patient and wife agree able to device  Plan for ILR implant tomorrow  does not need to be NPO  Continue to monitor on telemetry

## 2022-11-16 NOTE — MEDICAL STUDENT PROGRESS NOTE(EDUCATION) - NS MD HP STUD ASPLAN ASSES FT
IMPRESSION:   RLE weakness c/b mechanical fall and b/l hamstring pain likely 2/2 small-moderate acute infarct in the L ROSA territory. Mechanism likely embolic stroke of unspecified source. 83yo M hx of HTN, DM, presents from home with weakness and a fall last week at home. Wife at bedside reports he was getting up from sitting after putting on his shoes when he fell over. Patient reports he went down hard but did not hit head or lose consciousness. Patient reports feeling unbalanced but not dizzy. Weakness in both legs has started since then and has progressively worsened over the past week. Patient states he started walking with a cane since the fall on November 3rd. He says he can walk but he feels like he will fall. Per primary team, MRA as an outpatient was apparently not significant for stroke but showed atherosclerosis. Patient says he started physical therapy in July for overall health. He reports the past week he has pain in both hamstrings that he describes as "an alligator bit a hole in the back of his legs". Described as sharp, throbbing, and constant. Walking or using his legs does not make the pain worse. He thinks it is related to PT and the weakness and pain are inter-related. He saw his PCP for posterior thigh pain but said nothing was wrong. Denies rash in the area. Denies pain radiating from back to legs or pain radiating down legs from thighs. The pain is only located in the hamstring region. Patient denies vision changes, numbness, tingling, burning, tinnitus, headache, n/v, fever, chills, weight loss or gain without trying, back pain. Endorses urinary frequency but wife says that has been ongoing for quite some time. CT head c- and l-spine unremarkable. NIHSS 1.     Impression: RLE weakness 2/2 small to medium acute Left ROSA infarct due to acute ischemic stroke. Mechanism likely embolic stroke of undetermined source.

## 2022-11-16 NOTE — DISCHARGE NOTE PROVIDER - NSDCACTIVITY_GEN_ALL_CORE
Walking - Indoors allowed/Walking - Outdoors allowed Do not drive or operate machinery/Walking - Indoors allowed/No heavy lifting/straining/Walking - Outdoors allowed

## 2022-11-16 NOTE — DISCHARGE NOTE PROVIDER - NSDCFUSCHEDAPPT_GEN_ALL_CORE_FT
[facility] Physician [facility] [phone]  Scheduled Appointment: 12/07/2022

## 2022-11-16 NOTE — CONSULT NOTE ADULT - NS ATTEND AMEND GEN_ALL_CORE FT
81 y/o M w/ PMHx HTN, DM initially presented with RLE weakness found to have MR head with left genu and body corpus callosum acute infarction with remote deep infarctions on the cerebellum.  EPS consulted for ILR evaluation. Plan for placement tomorrow.

## 2022-11-17 ENCOUNTER — TRANSCRIPTION ENCOUNTER (OUTPATIENT)
Age: 82
End: 2022-11-17

## 2022-11-17 VITALS — SYSTOLIC BLOOD PRESSURE: 142 MMHG | DIASTOLIC BLOOD PRESSURE: 84 MMHG | HEART RATE: 78 BPM

## 2022-11-17 PROBLEM — I10 ESSENTIAL (PRIMARY) HYPERTENSION: Chronic | Status: ACTIVE | Noted: 2022-11-11

## 2022-11-17 PROBLEM — E11.9 TYPE 2 DIABETES MELLITUS WITHOUT COMPLICATIONS: Chronic | Status: ACTIVE | Noted: 2022-11-11

## 2022-11-17 LAB
ANION GAP SERPL CALC-SCNC: 10 MMOL/L — SIGNIFICANT CHANGE UP (ref 7–14)
BUN SERPL-MCNC: 44 MG/DL — HIGH (ref 7–23)
CALCIUM SERPL-MCNC: 9.8 MG/DL — SIGNIFICANT CHANGE UP (ref 8.4–10.5)
CHLORIDE SERPL-SCNC: 105 MMOL/L — SIGNIFICANT CHANGE UP (ref 98–107)
CO2 SERPL-SCNC: 24 MMOL/L — SIGNIFICANT CHANGE UP (ref 22–31)
CREAT SERPL-MCNC: 1.05 MG/DL — SIGNIFICANT CHANGE UP (ref 0.5–1.3)
EGFR: 71 ML/MIN/1.73M2 — SIGNIFICANT CHANGE UP
GLUCOSE BLDC GLUCOMTR-MCNC: 143 MG/DL — HIGH (ref 70–99)
GLUCOSE BLDC GLUCOMTR-MCNC: 235 MG/DL — HIGH (ref 70–99)
GLUCOSE BLDC GLUCOMTR-MCNC: 263 MG/DL — HIGH (ref 70–99)
GLUCOSE SERPL-MCNC: 170 MG/DL — HIGH (ref 70–99)
HCT VFR BLD CALC: 43 % — SIGNIFICANT CHANGE UP (ref 39–50)
HGB BLD-MCNC: 14.5 G/DL — SIGNIFICANT CHANGE UP (ref 13–17)
MAGNESIUM SERPL-MCNC: 1.8 MG/DL — SIGNIFICANT CHANGE UP (ref 1.6–2.6)
MCHC RBC-ENTMCNC: 28.9 PG — SIGNIFICANT CHANGE UP (ref 27–34)
MCHC RBC-ENTMCNC: 33.7 GM/DL — SIGNIFICANT CHANGE UP (ref 32–36)
MCV RBC AUTO: 85.7 FL — SIGNIFICANT CHANGE UP (ref 80–100)
NRBC # BLD: 0 /100 WBCS — SIGNIFICANT CHANGE UP (ref 0–0)
NRBC # FLD: 0 K/UL — SIGNIFICANT CHANGE UP (ref 0–0)
PHOSPHATE SERPL-MCNC: 3.3 MG/DL — SIGNIFICANT CHANGE UP (ref 2.5–4.5)
PLATELET # BLD AUTO: 138 K/UL — LOW (ref 150–400)
POTASSIUM SERPL-MCNC: 4.4 MMOL/L — SIGNIFICANT CHANGE UP (ref 3.5–5.3)
POTASSIUM SERPL-SCNC: 4.4 MMOL/L — SIGNIFICANT CHANGE UP (ref 3.5–5.3)
RBC # BLD: 5.02 M/UL — SIGNIFICANT CHANGE UP (ref 4.2–5.8)
RBC # FLD: 14.4 % — SIGNIFICANT CHANGE UP (ref 10.3–14.5)
SODIUM SERPL-SCNC: 139 MMOL/L — SIGNIFICANT CHANGE UP (ref 135–145)
WBC # BLD: 6.2 K/UL — SIGNIFICANT CHANGE UP (ref 3.8–10.5)
WBC # FLD AUTO: 6.2 K/UL — SIGNIFICANT CHANGE UP (ref 3.8–10.5)

## 2022-11-17 PROCEDURE — 33285 INSJ SUBQ CAR RHYTHM MNTR: CPT

## 2022-11-17 PROCEDURE — 99232 SBSQ HOSP IP/OBS MODERATE 35: CPT | Mod: FS

## 2022-11-17 PROCEDURE — 99239 HOSP IP/OBS DSCHRG MGMT >30: CPT

## 2022-11-17 RX ORDER — POLYETHYLENE GLYCOL 3350 17 G/17G
17 POWDER, FOR SOLUTION ORAL
Qty: 0 | Refills: 0 | DISCHARGE
Start: 2022-11-17

## 2022-11-17 RX ORDER — CLOPIDOGREL BISULFATE 75 MG/1
1 TABLET, FILM COATED ORAL
Qty: 20 | Refills: 0
Start: 2022-11-17 | End: 2022-12-06

## 2022-11-17 RX ORDER — ATORVASTATIN CALCIUM 80 MG/1
1 TABLET, FILM COATED ORAL
Qty: 30 | Refills: 0
Start: 2022-11-17 | End: 2022-12-16

## 2022-11-17 RX ORDER — AMLODIPINE BESYLATE 2.5 MG/1
1 TABLET ORAL
Qty: 30 | Refills: 0
Start: 2022-11-17 | End: 2022-12-16

## 2022-11-17 RX ORDER — ASPIRIN/CALCIUM CARB/MAGNESIUM 324 MG
1 TABLET ORAL
Qty: 90 | Refills: 0
Start: 2022-11-17 | End: 2023-02-14

## 2022-11-17 RX ORDER — SENNA PLUS 8.6 MG/1
2 TABLET ORAL
Qty: 0 | Refills: 0 | DISCHARGE
Start: 2022-11-17

## 2022-11-17 RX ADMIN — Medication 2: at 13:05

## 2022-11-17 RX ADMIN — AMLODIPINE BESYLATE 10 MILLIGRAM(S): 2.5 TABLET ORAL at 06:16

## 2022-11-17 RX ADMIN — POLYETHYLENE GLYCOL 3350 17 GRAM(S): 17 POWDER, FOR SOLUTION ORAL at 12:46

## 2022-11-17 RX ADMIN — CLOPIDOGREL BISULFATE 75 MILLIGRAM(S): 75 TABLET, FILM COATED ORAL at 17:17

## 2022-11-17 RX ADMIN — Medication 81 MILLIGRAM(S): at 12:46

## 2022-11-17 RX ADMIN — ENOXAPARIN SODIUM 40 MILLIGRAM(S): 100 INJECTION SUBCUTANEOUS at 06:15

## 2022-11-17 RX ADMIN — Medication 500 MILLIGRAM(S): at 06:16

## 2022-11-17 NOTE — PROGRESS NOTE ADULT - PROBLEM SELECTOR PLAN 2
- amlodipine to 10 mg QD
Elevated.   - c/w home amlodipine 5 mg QD - controlled for age
- amlodipine to 10 mg QD
- amlodipine to 10 mg QD
Elevated.   - inc home amlodipine to 10 mg QD
- amlodipine to 10 mg QD

## 2022-11-17 NOTE — PROGRESS NOTE ADULT - REASON FOR ADMISSION
Fall, LE weakness

## 2022-11-17 NOTE — PROGRESS NOTE ADULT - PROBLEM SELECTOR PLAN 5
DVT prophylaxis: lovenox  Diet: dash/tlc, cc  PT consult - rec home PT   check swallow eval   dw family members at bedside
DVT prophylaxis: lovenox  Diet: dash/tlc, cc  PT consult - rec home PT   dw wife at bedside
DVT prophylaxis: lovenox  Diet: dash/tlc, cc  PT consult  dw multiple family members at bedside
DVT prophylaxis: lovenox  Diet: dash/tlc, cc  PT consult - rec home PT   dw wife at bedside    DC home today after ILR. Time spent 45 mins

## 2022-11-17 NOTE — PROGRESS NOTE ADULT - PROBLEM SELECTOR PLAN 4
Plt 130, previous plt 141 in 2019 w/ w/u in past. Neg HIV and hep C and MM w/u.   - continue to monitor - stable
Plt 130, previous plt 141 in 2019 w/ w/u in past. Neg HIV and hep C and MM w/u.   - continue to monitor
Plt 130, previous plt 141 in 2019 w/ w/u in past. Neg HIV and hep C and MM w/u.   - continue to monitor - stable

## 2022-11-17 NOTE — PROGRESS NOTE ADULT - PROBLEM SELECTOR PLAN 3
Pt on home metformin 850 mg  and jardiance 10 mg  - start low dose ISS - FS controlled
Pt on home metformin 850 mg  and jardiance 10 mg  - start low dose ISS - FS controlled  -A1c 6.2
Pt on home metformin 850 mg  and jardiance 10 mg  - start low dose ISS - FS controlled  -A1c 6.2
Pt on home metformin 850 mg  and jardiance 10 mg  - start low dose ISS - FS controlled

## 2022-11-17 NOTE — DISCHARGE NOTE NURSING/CASE MANAGEMENT/SOCIAL WORK - NSDCPEFALRISK_GEN_ALL_CORE
For information on Fall & Injury Prevention, visit: [email or web address] OR  [email or web address] OR  https://www.cdc.gov/steadi/patient.html

## 2022-11-17 NOTE — PROGRESS NOTE ADULT - PROBLEM SELECTOR PROBLEM 4
Thrombocytopenia
Clothing

## 2022-11-17 NOTE — PROGRESS NOTE ADULT - SUBJECTIVE AND OBJECTIVE BOX
[facility]  Dr. Leta Boothe    Patient is a 82y old  Male who presents with a chief complaint of Fall, LE weakness (2022 10:52)    SUBJECTIVE / OVERNIGHT EVENTS: no events. Eating breakfast. Wife at bedside. Still reporting b/l posterior thigh pain and weakness. Discussed findings of MRI    MEDICATIONS  (STANDING):  amLODIPine   Tablet 5 milliGRAM(s) Oral daily  aspirin  chewable 81 milliGRAM(s) Oral daily  atorvastatin 80 milliGRAM(s) Oral at bedtime  coronavirus bivalent (EUA) Booster Vaccine (PFIZER) 0.3 milliLiter(s) IntraMuscular once  dextrose 5%. 1000 milliLiter(s) (50 mL/Hr) IV Continuous <Continuous>  dextrose 50% Injectable 25 Gram(s) IV Push once  enoxaparin Injectable 40 milliGRAM(s) SubCutaneous every 24 hours  glucagon  Injectable 1 milliGRAM(s) IntraMuscular once  insulin lispro (ADMELOG) corrective regimen sliding scale   SubCutaneous Before meals and at bedtime    MEDICATIONS  (PRN):  acetaminophen     Tablet .. 650 milliGRAM(s) Oral every 6 hours PRN Temp greater or equal to 38C (100.4F), Mild Pain (1 - 3)  dextrose Oral Gel 15 Gram(s) Oral once PRN Blood Glucose LESS THAN 70 milliGRAM(s)/deciliter    Vital Signs Last 24 Hrs  T(C): 36.7 (2022 09:00), Max: 37 (2022 05:29)  T(F): 98.1 (2022 09:00), Max: 98.6 (2022 05:29)  HR: 67 (2022 09:00) (58 - 67)  BP: 149/86 (2022 09:00) (132/74 - 156/83)  BP(mean): --  RR: 18 (2022 09:00) (17 - 18)  SpO2: 98% (2022 09:00) (98% - 100%)    Parameters below as of 2022 09:00  Patient On (Oxygen Delivery Method): room air    CAPILLARY BLOOD GLUCOSE  POCT Blood Glucose.: 159 mg/dL (2022 08:29)  POCT Blood Glucose.: 188 mg/dL (15 Nov 2022 22:50)  POCT Blood Glucose.: 249 mg/dL (15 Nov 2022 21:15)  POCT Blood Glucose.: 304 mg/dL (15 Nov 2022 19:37)  POCT Blood Glucose.: 195 mg/dL (15 Nov 2022 17:57)  POCT Blood Glucose.: 230 mg/dL (15 Nov 2022 12:23)      PHYSICAL EXAM:  GENERAL: NAD, well-developed, AOx3  HEAD:  Atraumatic, Normocephalic  EYES: EOMI, PERRL, conjunctiva and sclera clear  NECK: Supple, No JVD  CHEST/LUNG: Clear to auscultation bilaterally  HEART: Regular rate and rhythm; No murmurs, rubs, or gallops, No Edema  ABDOMEN: Soft, Nontender, Nondistended; Bowel sounds present  EXTREMITIES:  2+ Peripheral Pulses, No clubbing, cyanosis  PSYCH: No SI/HI  NEUROLOGY: CN: left UMN type facial weakness.  Mild dysarthria.  Motor: RSM slow, B.  No pronator drift. Variable effort - R HF with definite weakness.    Reflexes 2 in the arms. Left BR with spread to FF.  Knees 1, ankles 0.  Gait: slow narrow base, tentative.   SKIN: No rashes or lesions    LABS:                                   14.6   5.93  )-----------( 120      ( 15 Nov 2022 05:26 )             43.0   -15    139  |  105  |  37<H>  ----------------------------<  150<H>  3.9   |  23  |  0.88    Ca    9.4      15 Nov 2022 05:26  Phos  3.4     11-15  Mg     1.80     11-15      Urinalysis Basic - ( 2022 15:50 )    Color: Light Yellow / Appearance: Clear / S.032 / pH: x  Gluc: x / Ketone: Negative  / Bili: Negative / Urobili: <2 mg/dL   Blood: x / Protein: Negative / Nitrite: Negative   Leuk Esterase: Negative / RBC: na /HPF / WBC na /HPF   Sq Epi: x / Non Sq Epi: x / Bacteria: x      RADIOLOGY & ADDITIONAL TESTS:     < from: MR Head No Cont (11.15.22 @ 21:21) >  IMPRESSION:    1. 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 Cerebellar remote deep infarctions    < end of copied text >      Imaging Personally Reviewed:    Consultant(s) Notes Reviewed:  neuro    Care Discussed with Consultants/Other Providers:    
[facility]  Dr. Leta Boothe    Patient is a 82y old  Male who presents with a chief complaint of Fall, LE weakness (2022 10:52)    SUBJECTIVE / OVERNIGHT EVENTS: no events. ]Feels well. Wife at bedside. Understands that he will be discharged home after ILR today    MEDICATIONS  (STANDING):  amLODIPine   Tablet 5 milliGRAM(s) Oral daily  aspirin  chewable 81 milliGRAM(s) Oral daily  atorvastatin 80 milliGRAM(s) Oral at bedtime  coronavirus bivalent (EUA) Booster Vaccine (PFIZER) 0.3 milliLiter(s) IntraMuscular once  dextrose 5%. 1000 milliLiter(s) (50 mL/Hr) IV Continuous <Continuous>  dextrose 50% Injectable 25 Gram(s) IV Push once  enoxaparin Injectable 40 milliGRAM(s) SubCutaneous every 24 hours  glucagon  Injectable 1 milliGRAM(s) IntraMuscular once  insulin lispro (ADMELOG) corrective regimen sliding scale   SubCutaneous Before meals and at bedtime    MEDICATIONS  (PRN):  acetaminophen     Tablet .. 650 milliGRAM(s) Oral every 6 hours PRN Temp greater or equal to 38C (100.4F), Mild Pain (1 - 3)  dextrose Oral Gel 15 Gram(s) Oral once PRN Blood Glucose LESS THAN 70 milliGRAM(s)/deciliter    Vital Signs Last 24 Hrs  T(C): 36.7 (2022 09:00), Max: 37 (2022 05:29)  T(F): 98.1 (2022 09:00), Max: 98.6 (2022 05:29)  HR: 67 (2022 09:00) (58 - 67)  BP: 149/86 (2022 09:00) (132/74 - 156/83)  BP(mean): --  RR: 18 (2022 09:00) (17 - 18)  SpO2: 98% (2022 09:00) (98% - 100%)    Parameters below as of 2022 09:00  Patient On (Oxygen Delivery Method): room air    CAPILLARY BLOOD GLUCOSE    POCT Blood Glucose.: 263 mg/dL (2022 11:03)  POCT Blood Glucose.: 143 mg/dL (2022 09:05)  POCT Blood Glucose.: 195 mg/dL (2022 22:20)  POCT Blood Glucose.: 216 mg/dL (2022 21:17)  POCT Blood Glucose.: 172 mg/dL (2022 17:40)  POCT Blood Glucose.: 185 mg/dL (2022 12:41)      PHYSICAL EXAM:  GENERAL: NAD, well-developed, AOx3  HEAD:  Atraumatic, Normocephalic  EYES: EOMI, PERRL, conjunctiva and sclera clear  NECK: Supple, No JVD  CHEST/LUNG: Clear to auscultation bilaterally  HEART: Regular rate and rhythm; No murmurs, rubs, or gallops, No Edema  ABDOMEN: Soft, Nontender, Nondistended; Bowel sounds present  EXTREMITIES:  2+ Peripheral Pulses, No clubbing, cyanosis  PSYCH: No SI/HI  NEUROLOGY: CN: left UMN type facial weakness.  Mild dysarthria.  Motor: RSM slow, B.  No pronator drift. Variable effort - R HF with definite weakness.    Reflexes 2 in the arms. Left BR with spread to FF.  Knees 1, ankles 0.  Gait: slow narrow base, tentative.   SKIN: No rashes or lesions    LABS:                                   14.5   6.20  )-----------( 138      ( 2022 05:13 )             43.0       139  |  105  |  44<H>  ----------------------------<  170<H>  4.4   |  24  |  1.05    Ca    9.8      2022 05:13  Phos  3.3       Mg     1.80             Urinalysis Basic - ( 2022 15:50 )    Color: Light Yellow / Appearance: Clear / S.032 / pH: x  Gluc: x / Ketone: Negative  / Bili: Negative / Urobili: <2 mg/dL   Blood: x / Protein: Negative / Nitrite: Negative   Leuk Esterase: Negative / RBC: na /HPF / WBC na /HPF   Sq Epi: x / Non Sq Epi: x / Bacteria: x      RADIOLOGY & ADDITIONAL TESTS:     < from: MR Head No Cont (11.15.22 @ 21:21) >  IMPRESSION:    1. 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 Cerebellar remote deep infarctions    < end of copied text >      Imaging Personally Reviewed:    Consultant(s) Notes Reviewed:  neuro    Care Discussed with Consultants/Other Providers: neuro    
[facility]  Dr. Leta Boothe    Patient is a 82y old  Male who presents with a chief complaint of Fall, LE weakness (2022 10:52)    SUBJECTIVE / OVERNIGHT EVENTS: no events - neuro exam unchanged. Wife at bedside. Still reporting b/l posterior thigh pain and weakness. Frustrated that MRI is taking long. Also reports constipation.     MEDICATIONS  (STANDING):  amLODIPine   Tablet 5 milliGRAM(s) Oral daily  aspirin  chewable 81 milliGRAM(s) Oral daily  atorvastatin 80 milliGRAM(s) Oral at bedtime  coronavirus bivalent (EUA) Booster Vaccine (PFIZER) 0.3 milliLiter(s) IntraMuscular once  dextrose 5%. 1000 milliLiter(s) (50 mL/Hr) IV Continuous <Continuous>  dextrose 50% Injectable 25 Gram(s) IV Push once  enoxaparin Injectable 40 milliGRAM(s) SubCutaneous every 24 hours  glucagon  Injectable 1 milliGRAM(s) IntraMuscular once  insulin lispro (ADMELOG) corrective regimen sliding scale   SubCutaneous Before meals and at bedtime    MEDICATIONS  (PRN):  acetaminophen     Tablet .. 650 milliGRAM(s) Oral every 6 hours PRN Temp greater or equal to 38C (100.4F), Mild Pain (1 - 3)  dextrose Oral Gel 15 Gram(s) Oral once PRN Blood Glucose LESS THAN 70 milliGRAM(s)/deciliter    Vital Signs Last 24 Hrs  T(C): 36.7 (15 Nov 2022 05:27), Max: 36.7 (2022 17:00)  T(F): 98 (15 Nov 2022 05:27), Max: 98.1 (2022 17:00)  HR: 64 (15 Nov 2022 05:27) (64 - 66)  BP: 140/84 (15 Nov 2022 05:27) (140/84 - 156/72)  BP(mean): --  RR: 17 (15 Nov 2022 05:27) (17 - 18)  SpO2: 97% (15 Nov 2022 05:27) (97% - 100%)    Parameters below as of 15 Nov 2022 05:27  Patient On (Oxygen Delivery Method): room air    CAPILLARY BLOOD GLUCOSE  POCT Blood Glucose.: 154 mg/dL (15 Nov 2022 09:09)  POCT Blood Glucose.: 193 mg/dL (2022 23:18)  POCT Blood Glucose.: 204 mg/dL (2022 21:13)  POCT Blood Glucose.: 182 mg/dL (2022 17:35)  POCT Blood Glucose.: 155 mg/dL (2022 12:47)        PHYSICAL EXAM:  GENERAL: NAD, well-developed, AOx3  HEAD:  Atraumatic, Normocephalic  EYES: EOMI, PERRL, conjunctiva and sclera clear  NECK: Supple, No JVD  CHEST/LUNG: Clear to auscultation bilaterally  HEART: Regular rate and rhythm; No murmurs, rubs, or gallops, No Edema  ABDOMEN: Soft, Nontender, Nondistended; Bowel sounds present  EXTREMITIES:  2+ Peripheral Pulses, No clubbing, cyanosis  PSYCH: No SI/HI  NEUROLOGY: CN: left UMN type facial weakness.  Mild dysarthria.  Motor: RSM slow, B.  No pronator drift. Variable effort - R HF with definite weakness.    Reflexes 2 in the arms. Left BR with spread to FF.  Knees 1, ankles 0.  Gait: slow narrow base, tentative.   SKIN: No rashes or lesions    LABS:                                   14.6   5.93  )-----------( 120      ( 15 Nov 2022 05:26 )             43.0   11-15    139  |  105  |  37<H>  ----------------------------<  150<H>  3.9   |  23  |  0.88    Ca    9.4      15 Nov 2022 05:26  Phos  3.4     -15  Mg     1.80     -15                 Urinalysis Basic - ( 2022 15:50 )    Color: Light Yellow / Appearance: Clear / S.032 / pH: x  Gluc: x / Ketone: Negative  / Bili: Negative / Urobili: <2 mg/dL   Blood: x / Protein: Negative / Nitrite: Negative   Leuk Esterase: Negative / RBC: na /HPF / WBC na /HPF   Sq Epi: x / Non Sq Epi: x / Bacteria: x      RADIOLOGY & ADDITIONAL TESTS:    Imaging Personally Reviewed:    Consultant(s) Notes Reviewed:  neuro    Care Discussed with Consultants/Other Providers:    
Interval History:  No acute events overnight   Telemetry: NSR; no afib noted     MEDICATIONS  (STANDING):  amLODIPine   Tablet 10 milliGRAM(s) Oral daily  amoxicillin 500 milliGRAM(s) Oral every 8 hours  aspirin  chewable 81 milliGRAM(s) Oral daily  atorvastatin 80 milliGRAM(s) Oral at bedtime  clopidogrel Tablet 75 milliGRAM(s) Oral daily  coronavirus bivalent (EUA) Booster Vaccine (PFIZER) 0.3 milliLiter(s) IntraMuscular once  dextrose 5%. 1000 milliLiter(s) (50 mL/Hr) IV Continuous <Continuous>  dextrose 50% Injectable 25 Gram(s) IV Push once  enoxaparin Injectable 40 milliGRAM(s) SubCutaneous every 24 hours  glucagon  Injectable 1 milliGRAM(s) IntraMuscular once  insulin lispro (ADMELOG) corrective regimen sliding scale   SubCutaneous Before meals and at bedtime  polyethylene glycol 3350 17 Gram(s) Oral daily  senna 2 Tablet(s) Oral at bedtime    MEDICATIONS  (PRN):  acetaminophen     Tablet .. 650 milliGRAM(s) Oral every 6 hours PRN Temp greater or equal to 38C (100.4F), Mild Pain (1 - 3)  dextrose Oral Gel 15 Gram(s) Oral once PRN Blood Glucose LESS THAN 70 milliGRAM(s)/deciliter    Vital Signs Last 24 Hrs  T(C): 36.8 (11-17-22 @ 06:16), Max: 36.8 (11-17-22 @ 06:16)  T(F): 98.2 (11-17-22 @ 06:16), Max: 98.2 (11-17-22 @ 06:16)  HR: 66 (11-17-22 @ 06:16) (64 - 67)  BP: 136/74 (11-17-22 @ 06:16) (134/69 - 144/79)  BP(mean): --  RR: 17 (11-17-22 @ 06:16) (17 - 18)  SpO2: 99% (11-17-22 @ 06:16) (98% - 100%)    Appearance: Normal	  HEENT:   Normal oral mucosa, PERRL, EOMI	  Lymphatic: No lymphadenopathy  Cardiovascular: Normal S1 S2, No JVD, No murmurs, No edema  Respiratory: Lungs clear to auscultation	  Psychiatry: A & O x 3, Mood & affect appropriate  Gastrointestinal:  Soft, Non-tender, + BS	  Skin: No rashes, No ecchymoses, No cyanosis	  Neurologic: Non-focal  Extremities: Normal range of motion, No clubbing, cyanosis or edema  Vascular: Peripheral pulses palpable 2+ bilaterally    LABS:	 	    CBC Full  -  ( 17 Nov 2022 05:13 )  WBC Count : 6.20 K/uL  Hemoglobin : 14.5 g/dL  Hematocrit : 43.0 %  Platelet Count - Automated : 138 K/uL  Mean Cell Volume : 85.7 fL  Mean Cell Hemoglobin : 28.9 pg  Mean Cell Hemoglobin Concentration : 33.7 gm/dL  Auto Neutrophil # : x  Auto Lymphocyte # : x  Auto Monocyte # : x  Auto Eosinophil # : x  Auto Basophil # : x  Auto Neutrophil % : x  Auto Lymphocyte % : x  Auto Monocyte % : x  Auto Eosinophil % : x  Auto Basophil % : x    11-17    139  |  105  |  44<H>  ----------------------------<  170<H>  4.4   |  24  |  1.05    Ca    9.8      17 Nov 2022 05:13  Phos  3.3     11-17  Mg     1.80     11-17        
Neurology Progress Note    SUBJECTIVE/OBJECTIVE/INTERVAL EVENTS: Patient seen and examined at bedside w/ neuro attending and team.     INTERVAL HISTORY: No acute events overnight. Pt states he feels well. Discussed loop recorder with pt and family and they were amenable.    ROS: No vision changes, no focal weakness/numbness.     VITALS & EXAMINATION:  Vital Signs Last 24 Hrs  T(C): 36.7 (16 Nov 2022 09:00), Max: 37 (16 Nov 2022 05:29)  T(F): 98.1 (16 Nov 2022 09:00), Max: 98.6 (16 Nov 2022 05:29)  HR: 67 (16 Nov 2022 09:00) (58 - 67)  BP: 149/86 (16 Nov 2022 09:00) (132/74 - 156/83)  BP(mean): --  RR: 18 (16 Nov 2022 09:00) (17 - 18)  SpO2: 98% (16 Nov 2022 09:00) (98% - 100%)    Parameters below as of 16 Nov 2022 09:00  Patient On (Oxygen Delivery Method): room air    Constitutional: Male, appears stated age   Head: Normocephalic; Eyes: clear sclera;   Resp: breathing comfortably     Neurological (>12):  MS: Eyes open, alert. Follows commands. Attends to examiner.  Language: Speech is normal, clear, fluent.  CNs: VFF. EOMI. Slight R facial droop. Hearing grossly normal b/l.      Motor - Normal bulk and tone throughout. Mild R leg drift but does not fall to bed.     L/R         Deltoid  5/5    Biceps   5/5      Triceps  5/5     Wrist Extension 5/5   Wrist Flexion  5/5      L/R         Hip Flexion  4/5    Hip Extension  5/5  Dorsiflexion  5/5    Plantar Flexion 5/5     Sensation: Intact to LT b/l without extinction.  Reflexes: Frontal release signs absent, including absent glabellar tap, snout, palmomental, and grasp reflexes.  Coordination: No dysmetria to FTN b/l UE.    LABORATORY:  CBC                       14.6   5.93  )-----------( 120      ( 15 Nov 2022 05:26 )             43.0     Chem 11-15    139  |  105  |  37<H>  ----------------------------<  150<H>  3.9   |  23  |  0.88    Ca    9.4      15 Nov 2022 05:26  Phos  3.4     11-15  Mg     1.80     11-15    Lipid Panel 11-13 Chol 167 LDL -- HDL 51 Trig 67    < from: MR Head No Cont (11.15.22 @ 21:21) >  FINDINGS:    BRAIN:   The brain demonstrates a focal lesion within the deep white   matter of the left frontal lobe consistent with acute infarction. This   involves the left genu of the corpus callosum extending anterior to   posterior within the left callosal body and the pericallosal white   matter. Small noncontiguous foci present in the adjacent deep white   matter and within the posterior body and splenium of corpus callosum.   This acute infarct demonstrates diffusion restriction. This infarct is   brightly hyperintense on diffusion weighted images, intermediate   hyperintense on the long TR images, low signal intensity on ADC images   and is largely inconspicuous on the remaining pulse sequences.    Remote infarctions also present in each cerebellar hemisphere more   conspicuous on the left than the right.   No abnormal enhancement is   found in the brain.  No acute cerebral cortical infarct is found.   No   intracranial hemorrhage is recognized.  No mass effect is found in the   brain.   The brain also demonstrates a few additional small indistinct   lesions within the cerebral hemispheric white matter that suggest   ischemic white matter disease. These lesions are hyperintense on the long   TR images, otherwise inconspicuous. Lesions are scattered within the   cerebral hemispheric white matter.    CSF SPACES:   The ventricles, sulci and basal cisterns  appear mildly   dilated reflecting diffuse brain volume loss.   Tiny subcortical vertex   and forebrain Virchow Ramírez spaces are noted.    VESSELS:   The vertebral and internal carotid arteries demonstrate   expected flow voids indicating their patency. The left vertebral artery   is dominant caliber    HEAD AND NECK STRUCTURES:   The orbits are unremarkable.  Paranasal   sinuses are clear.  The nasal cavity appears intact.  The central skull   base appears intact.  The nasopharynx is symmetric.  The temporal bones   appear clear of disease.  The calvarium appears unremarkable.      IMPRESSION:    1. 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 Cerebellar remote deep infarctions      < end of copied text >  
Patient is a 82y old  Male who presents with a chief complaint of Fall, LE weakness (2022 21:07)      SUBJECTIVE / OVERNIGHT EVENTS: still endorsing LE weakness, R>L    ROS:  No HA/DZ  No Vision changes   No CP, SOB  No N/V/D  No Edema  No Rash  NO weakness, numbness    MEDICATIONS  (STANDING):  amLODIPine   Tablet 5 milliGRAM(s) Oral daily  coronavirus bivalent (EUA) Booster Vaccine (PFIZER) 0.3 milliLiter(s) IntraMuscular once  dextrose 5%. 1000 milliLiter(s) (50 mL/Hr) IV Continuous <Continuous>  dextrose 50% Injectable 25 Gram(s) IV Push once  enoxaparin Injectable 40 milliGRAM(s) SubCutaneous every 24 hours  glucagon  Injectable 1 milliGRAM(s) IntraMuscular once  insulin lispro (ADMELOG) corrective regimen sliding scale   SubCutaneous Before meals and at bedtime    MEDICATIONS  (PRN):  acetaminophen     Tablet .. 650 milliGRAM(s) Oral every 6 hours PRN Temp greater or equal to 38C (100.4F), Mild Pain (1 - 3)  dextrose Oral Gel 15 Gram(s) Oral once PRN Blood Glucose LESS THAN 70 milliGRAM(s)/deciliter      T(C): 36.4 (22 @ 05:45)  HR: 63 (22 @ 05:45)  BP: 143/79 (22 @ 05:45)  RR: 18 (22 @ 05:45)  SpO2: 100% (22 @ 05:45)  CAPILLARY BLOOD GLUCOSE      POCT Blood Glucose.: 130 mg/dL (2022 09:16)  POCT Blood Glucose.: 127 mg/dL (2022 21:55)  POCT Blood Glucose.: 210 mg/dL (2022 12:18)    I&O's Summary      PHYSICAL EXAM:  GENERAL: NAD, well-developed, AOx3  HEAD:  Atraumatic, Normocephalic  EYES: EOMI, PERRL, conjunctiva and sclera clear  NECK: Supple, No JVD  CHEST/LUNG: Clear to auscultation bilaterally  HEART: Regular rate and rhythm; No murmurs, rubs, or gallops, No Edema  ABDOMEN: Soft, Nontender, Nondistended; Bowel sounds present  EXTREMITIES:  2+ Peripheral Pulses, No clubbing, cyanosis  PSYCH: No SI/HI  NEUROLOGY: non-focal  SKIN: No rashes or lesions    LABS:                        15.1   5.14  )-----------( 122      ( 2022 07:29 )             45.1     -    140  |  107  |  26<H>  ----------------------------<  136<H>  3.8   |  23  |  0.95    Ca    9.4      2022 07:29  Phos  3.7       Mg     2.00         TPro  7.2  /  Alb  4.2  /  TBili  0.4  /  DBili  x   /  AST  18  /  ALT  15  /  AlkPhos  71  -          Urinalysis Basic - ( 2022 15:50 )    Color: Light Yellow / Appearance: Clear / S.032 / pH: x  Gluc: x / Ketone: Negative  / Bili: Negative / Urobili: <2 mg/dL   Blood: x / Protein: Negative / Nitrite: Negative   Leuk Esterase: Negative / RBC: na /HPF / WBC na /HPF   Sq Epi: x / Non Sq Epi: x / Bacteria: x            RADIOLOGY & ADDITIONAL TESTS:    Imaging Personally Reviewed:    Consultant(s) Notes Reviewed:      Care Discussed with Consultants/Other Providers:  
[facility]  Dr. Leta Boothe    Patient is a 82y old  Male who presents with a chief complaint of Fall, LE weakness (2022 10:52)      SUBJECTIVE / OVERNIGHT EVENTS: no events - neuro exam unchanged. Wife at bedside. Still reporting b/l leg weakness. Also reports constipation.    MEDICATIONS  (STANDING):  amLODIPine   Tablet 5 milliGRAM(s) Oral daily  aspirin  chewable 81 milliGRAM(s) Oral daily  atorvastatin 80 milliGRAM(s) Oral at bedtime  coronavirus bivalent (EUA) Booster Vaccine (PFIZER) 0.3 milliLiter(s) IntraMuscular once  dextrose 5%. 1000 milliLiter(s) (50 mL/Hr) IV Continuous <Continuous>  dextrose 50% Injectable 25 Gram(s) IV Push once  enoxaparin Injectable 40 milliGRAM(s) SubCutaneous every 24 hours  glucagon  Injectable 1 milliGRAM(s) IntraMuscular once  insulin lispro (ADMELOG) corrective regimen sliding scale   SubCutaneous Before meals and at bedtime    MEDICATIONS  (PRN):  acetaminophen     Tablet .. 650 milliGRAM(s) Oral every 6 hours PRN Temp greater or equal to 38C (100.4F), Mild Pain (1 - 3)  dextrose Oral Gel 15 Gram(s) Oral once PRN Blood Glucose LESS THAN 70 milliGRAM(s)/deciliter    Vital Signs Last 24 Hrs  T(C): 36.6 (2022 11:15), Max: 36.8 (2022 21:52)  T(F): 97.8 (2022 11:15), Max: 98.3 (2022 21:52)  HR: 64 (2022 11:15) (55 - 74)  BP: 143/73 (2022 11:15) (140/65 - 144/78)  BP(mean): --  RR: 17 (2022 11:15) (16 - 18)  SpO2: 100% (2022 11:15) (97% - 100%)    Parameters below as of 2022 11:15  Patient On (Oxygen Delivery Method): room air    CAPILLARY BLOOD GLUCOSE  POCT Blood Glucose.: 144 mg/dL (2022 09:00)  POCT Blood Glucose.: 179 mg/dL (2022 20:52)  POCT Blood Glucose.: 231 mg/dL (2022 17:54)  POCT Blood Glucose.: 157 mg/dL (2022 12:27)      PHYSICAL EXAM:  GENERAL: NAD, well-developed, AOx3  HEAD:  Atraumatic, Normocephalic  EYES: EOMI, PERRL, conjunctiva and sclera clear  NECK: Supple, No JVD  CHEST/LUNG: Clear to auscultation bilaterally  HEART: Regular rate and rhythm; No murmurs, rubs, or gallops, No Edema  ABDOMEN: Soft, Nontender, Nondistended; Bowel sounds present  EXTREMITIES:  2+ Peripheral Pulses, No clubbing, cyanosis  PSYCH: No SI/HI  NEUROLOGY: CN: left UMN type facial weakness.  Mild dysarthria.  Motor: RSM slow, B.  No pronator drift. Variable effort - R HF with definite weakness.    Reflexes 2 in the arms. Left BR with spread to FF.  Knees 1, ankles 0.  Gait: slow narrow base, tentative.   SKIN: No rashes or lesions    LABS:                                   14.8   5.99  )-----------( 117      ( 2022 07:27 )             44.9   11-14    142  |  107  |  39<H>  ----------------------------<  153<H>  4.2   |  23  |  0.96    Ca    9.6      2022 07:27  Phos  3.3     11-14  Mg     1.90     11-14        Urinalysis Basic - ( 2022 15:50 )    Color: Light Yellow / Appearance: Clear / S.032 / pH: x  Gluc: x / Ketone: Negative  / Bili: Negative / Urobili: <2 mg/dL   Blood: x / Protein: Negative / Nitrite: Negative   Leuk Esterase: Negative / RBC: na /HPF / WBC na /HPF   Sq Epi: x / Non Sq Epi: x / Bacteria: x      RADIOLOGY & ADDITIONAL TESTS:    Imaging Personally Reviewed:    Consultant(s) Notes Reviewed:  neuro    Care Discussed with Consultants/Other Providers:    
Patient is a 82y old  Male who presents with a chief complaint of Fall, LE weakness (2022 10:52)      SUBJECTIVE / OVERNIGHT EVENTS: no events - neuro exam unchanged     ROS:  No HA/DZ  No Vision changes   No CP, SOB  No N/V/D  No Edema  No Rash  NO weakness, numbness    MEDICATIONS  (STANDING):  amLODIPine   Tablet 5 milliGRAM(s) Oral daily  aspirin  chewable 81 milliGRAM(s) Oral daily  atorvastatin 80 milliGRAM(s) Oral at bedtime  coronavirus bivalent (EUA) Booster Vaccine (PFIZER) 0.3 milliLiter(s) IntraMuscular once  dextrose 5%. 1000 milliLiter(s) (50 mL/Hr) IV Continuous <Continuous>  dextrose 50% Injectable 25 Gram(s) IV Push once  enoxaparin Injectable 40 milliGRAM(s) SubCutaneous every 24 hours  glucagon  Injectable 1 milliGRAM(s) IntraMuscular once  insulin lispro (ADMELOG) corrective regimen sliding scale   SubCutaneous Before meals and at bedtime    MEDICATIONS  (PRN):  acetaminophen     Tablet .. 650 milliGRAM(s) Oral every 6 hours PRN Temp greater or equal to 38C (100.4F), Mild Pain (1 - 3)  dextrose Oral Gel 15 Gram(s) Oral once PRN Blood Glucose LESS THAN 70 milliGRAM(s)/deciliter      T(C): 37 (22 @ 05:30)  HR: 74 (22 @ 05:30)  BP: 160/74 (22 @ 05:30)  RR: 17 (22 @ 05:30)  SpO2: 100% (22 @ 05:30)  CAPILLARY BLOOD GLUCOSE      POCT Blood Glucose.: 147 mg/dL (2022 09:10)  POCT Blood Glucose.: 181 mg/dL (2022 22:23)  POCT Blood Glucose.: 119 mg/dL (2022 18:02)  POCT Blood Glucose.: 209 mg/dL (2022 12:11)    I&O's Summary      PHYSICAL EXAM:  GENERAL: NAD, well-developed, AOx3  HEAD:  Atraumatic, Normocephalic  EYES: EOMI, PERRL, conjunctiva and sclera clear  NECK: Supple, No JVD  CHEST/LUNG: Clear to auscultation bilaterally  HEART: Regular rate and rhythm; No murmurs, rubs, or gallops, No Edema  ABDOMEN: Soft, Nontender, Nondistended; Bowel sounds present  EXTREMITIES:  2+ Peripheral Pulses, No clubbing, cyanosis  PSYCH: No SI/HI  NEUROLOGY: CN: left UMN type facial weakness.  Mild dysarthria.  Motor: RSM slow, B.  No pronator drift. Variable effort - R HF with definite weakness.    Reflexes 2 in the arms. Left BR with spread to FF.  Knees 1, ankles 0.  Gait: slow narrow base, tentative.   SKIN: No rashes or lesions    LABS:                        14.5   5.69  )-----------( 124      ( 2022 06:05 )             43.9     11-    142  |  107  |  31<H>  ----------------------------<  140<H>  4.2   |  22  |  0.93    Ca    9.6      2022 06:05  Phos  3.6     11-13  Mg     1.90     -    TPro  7.2  /  Alb  4.2  /  TBili  0.4  /  DBili  x   /  AST  18  /  ALT  15  /  AlkPhos  71  11-11      CARDIAC MARKERS ( 2022 07:29 )  x     / x     / 83 U/L / x     / x          Urinalysis Basic - ( 2022 15:50 )    Color: Light Yellow / Appearance: Clear / S.032 / pH: x  Gluc: x / Ketone: Negative  / Bili: Negative / Urobili: <2 mg/dL   Blood: x / Protein: Negative / Nitrite: Negative   Leuk Esterase: Negative / RBC: na /HPF / WBC na /HPF   Sq Epi: x / Non Sq Epi: x / Bacteria: x            RADIOLOGY & ADDITIONAL TESTS:    Imaging Personally Reviewed:    Consultant(s) Notes Reviewed:      Care Discussed with Consultants/Other Providers:

## 2022-11-17 NOTE — PROGRESS NOTE ADULT - ASSESSMENT
82yoM w/ PMHx HTN, DM initially presented with RLE weakness found to have MR head with left genu and body corpus callosum acute infarction with remote deep infarctions on the cerebellum.  EPS consulted for ILR evaluation.  As per patient and wife at bedside, no cardiac history or history of arrhthymias, chest pain, palpitations, lightheadedness, dizziness.  On telemetry and EKG NSR with occasional PVCs.      ILR evaluation  Discussed with patient and his wife about an ILR, reviewed risks and benefits; all questions were answered   Patient and wife agree able to device  Plan for ILR implant today  does not need to be NPO  Continue to monitor on telemetry  82yoM w/ PMHx HTN, DM initially presented with RLE weakness found to have MR head with left genu and body corpus callosum acute infarction with remote deep infarctions on the cerebellum.  EPS consulted for ILR evaluation.  As per patient and wife at bedside, no cardiac history or history of arrhthymias, chest pain, palpitations, lightheadedness, dizziness.  On telemetry and EKG NSR with occasional PVCs.      ILR evaluation  Discussed with patient and his wife about an ILR, reviewed risks and benefits; all questions were answered   Patient and wife agree able to device  Plan for ILR implant today  does not need to be NPO  Continue to monitor on telemetry      ADDEN:   Patient s/p ILR procedure   Site checked - clean dry and intact  Discussed with patient discharge instructions which included removing the dressing 24 hours post device implant; look for signs of infections including redness, swelling, pain, fever, chills for which patient can call the office number or go to the closest ER   If patient develops severe symptoms like chest pain or SOB, will go to the closest ER   Patient can resume normal activities and diet  Follow up date and time provided

## 2022-11-17 NOTE — PHARMACOTHERAPY INTERVENTION NOTE - COMMENTS
Discharge medications reviewed with the patient and wife. Outpatient medication schedule was discussed in detail including: medication name, indication, dose, administration times, treatment duration, side effects and special instructions. Patient questions and concerns were answered and addressed. Patient demonstrated understanding and provided with educational handout.     Shelley Collado, PharmD, Clinical Pharmacy Specialist, [id number]

## 2022-11-17 NOTE — DISCHARGE NOTE NURSING/CASE MANAGEMENT/SOCIAL WORK - PATIENT PORTAL LINK FT
You can access the FollowMyHealth Patient Portal offered by [facility] by registering at the following website: [email or web address]. By joining [email or web address] FollowMyHealth portal, you will also be able to view your health information using other applications (apps) compatible with our system.

## 2022-11-17 NOTE — DISCHARGE NOTE NURSING/CASE MANAGEMENT/SOCIAL WORK - NSDCFUADDAPPT_GEN_ALL_CORE_FT
Follow up with Stroke NP, Priti Boudreaux or Angelina Gleason, in clinic at [address] [place] [address]. ([phone])    EP follow up for ILR check (loop recorder) on 12/7 @ 12:40pm  [phone]

## 2022-11-17 NOTE — PROGRESS NOTE ADULT - PROBLEM SELECTOR PROBLEM 1
Bilateral leg weakness

## 2022-11-17 NOTE — PROGRESS NOTE ADULT - PROBLEM SELECTOR PLAN 1
Pt presenting with b/l LE weakness RLE > LLE x1 week.  Neuro evaluation appreciated  presentation concerning for CVA, MRi head, MRA neck pending  TTE performed and noted to be remarkable for stage 1 diastolic dysfunction. Pt appears euvolemic currently.   C/w ASA and statin   CTH neg for acute infarct  CT spine w multilevel degenerative dz with mod stenosis - pending MR L spine - considering LE sympotms, spine eval called, rec await MR spine   - neuro checks q4h  - Home PT
Pt presenting with b/l LE weakness RLE > LLE x1 week.  case dw neuro attending yesterday - presentation concerning for CVA, so check MRH, MRA neck, TTE, start ASA and statin   CTH neg for acute infarct  B12, Folate, TSH, CPK ok   CT spine w multilevel degenerative dz with mod stenosis - pending MR L spine - considering LE sympotms, spine eval called, rec await MR spine   - neuro checks q4h  - Home PT  - swallow eval
Pt presenting with b/l LE weakness RLE > LLE x1 week.  on exam motor strength intact   CTH neg for acute infarct  B12, Folate, TSH ok - add CPK  CT spine w multilevel degenerative dz with mod stenosis - pending MR L spine - considering LE sympotms, spine eval  Pt reports family hx of ALS.    - f/u MR l-spine  - f/u neuro recs  - neuro checks q4h
Pt presenting with b/l LE weakness RLE > LLE x1 week.  Neuro evaluation appreciated  presentation concerning for CVA,   MRI shows acute infarction of cerebellum and left genu.   MRI lumbar spine only with mild degenerative disease  TTE performed and noted to be remarkable for stage 1 diastolic dysfunction. Pt appears euvolemic currently.   C/w ASA and statin   - neuro checks q4h  - Home PT  -awaiting ILR today  DC home today
Pt presenting with b/l LE weakness RLE > LLE x1 week.  Neuro evaluation appreciated  presentation concerning for CVA,   MRI shows acute infarction of cerebellum and left genu.   MRI lumbar spine only with mild degenerative disease  TTE performed and noted to be remarkable for stage 1 diastolic dysfunction. Pt appears euvolemic currently.   C/w ASA and statin   - neuro checks q4h  - Home PT  -awaiting further neuro recs - ILR vs dc home
Pt presenting with b/l LE weakness RLE > LLE x1 week.  Neuro evaluation appreciated  presentation concerning for CVA, MRi head, MRA neck pending  TTE performed and on remarkable for stage 1 diastolic dysfunction  C/w ASA and statin   CTH neg for acute infarct  CT spine w multilevel degenerative dz with mod stenosis - pending MR L spine - considering LE sympotms, spine eval called, rec await MR spine   - neuro checks q4h  - Home PT

## 2022-11-17 NOTE — PROGRESS NOTE ADULT - NSPROGADDITIONALINFOA_GEN_ALL_CORE
Dispo pending MRI
plan discussed with daughter Roberto today
plan discussed with daughter Roberto today

## 2022-11-18 PROBLEM — Z00.00 ENCOUNTER FOR PREVENTIVE HEALTH EXAMINATION: Status: ACTIVE | Noted: 2022-11-18

## 2022-12-07 ENCOUNTER — APPOINTMENT (OUTPATIENT)
Dept: ELECTROPHYSIOLOGY | Facility: CLINIC | Age: 82
End: 2022-12-07

## 2022-12-07 VITALS — DIASTOLIC BLOOD PRESSURE: 67 MMHG | RESPIRATION RATE: 14 BRPM | SYSTOLIC BLOOD PRESSURE: 124 MMHG | HEART RATE: 67 BPM

## 2022-12-07 DIAGNOSIS — E11.9 TYPE 2 DIABETES MELLITUS W/OUT COMPLICATIONS: ICD-10-CM

## 2022-12-07 DIAGNOSIS — D69.6 THROMBOCYTOPENIA, UNSPECIFIED: ICD-10-CM

## 2022-12-07 DIAGNOSIS — I10 ESSENTIAL (PRIMARY) HYPERTENSION: ICD-10-CM

## 2022-12-07 DIAGNOSIS — Z78.9 OTHER SPECIFIED HEALTH STATUS: ICD-10-CM

## 2022-12-07 PROCEDURE — 93285 PRGRMG DEV EVAL SCRMS IP: CPT

## 2022-12-07 RX ORDER — EMPAGLIFLOZIN 10 MG/1
10 TABLET, FILM COATED ORAL
Refills: 0 | Status: ACTIVE | COMMUNITY

## 2022-12-07 RX ORDER — ASPIRIN 81 MG/1
81 TABLET, CHEWABLE ORAL
Refills: 0 | Status: ACTIVE | COMMUNITY

## 2022-12-07 RX ORDER — METFORMIN HYDROCHLORIDE 850 MG/1
850 TABLET, COATED ORAL
Refills: 0 | Status: ACTIVE | COMMUNITY

## 2022-12-07 RX ORDER — AMLODIPINE BESYLATE 10 MG/1
10 TABLET ORAL
Refills: 0 | Status: ACTIVE | COMMUNITY

## 2023-01-09 ENCOUNTER — APPOINTMENT (OUTPATIENT)
Dept: ELECTROPHYSIOLOGY | Facility: CLINIC | Age: 83
End: 2023-01-09
Payer: MEDICARE

## 2023-01-09 ENCOUNTER — NON-APPOINTMENT (OUTPATIENT)
Age: 83
End: 2023-01-09

## 2023-01-09 PROCEDURE — G2066: CPT

## 2023-01-09 PROCEDURE — 93298 REM INTERROG DEV EVAL SCRMS: CPT

## 2023-01-10 ENCOUNTER — APPOINTMENT (OUTPATIENT)
Dept: NEUROLOGY | Facility: CLINIC | Age: 83
End: 2023-01-10
Payer: MEDICARE

## 2023-01-10 PROCEDURE — 93892 TCD EMBOLI DETECT W/O INJ: CPT

## 2023-01-10 PROCEDURE — 93880 EXTRACRANIAL BILAT STUDY: CPT

## 2023-01-10 PROCEDURE — 99215 OFFICE O/P EST HI 40 MIN: CPT

## 2023-01-10 PROCEDURE — 93886 INTRACRANIAL COMPLETE STUDY: CPT

## 2023-01-10 RX ORDER — ATORVASTATIN CALCIUM 40 MG/1
40 TABLET, FILM COATED ORAL
Qty: 90 | Refills: 0 | Status: ACTIVE | COMMUNITY
Start: 2023-01-10 | End: 1900-01-01

## 2023-01-10 RX ORDER — ATORVASTATIN CALCIUM 80 MG/1
80 TABLET, FILM COATED ORAL
Refills: 0 | Status: ACTIVE | COMMUNITY

## 2023-02-03 ENCOUNTER — OUTPATIENT (OUTPATIENT)
Dept: OUTPATIENT SERVICES | Facility: HOSPITAL | Age: 83
LOS: 1 days | End: 2023-02-03
Payer: MEDICARE

## 2023-02-03 ENCOUNTER — APPOINTMENT (OUTPATIENT)
Dept: MRI IMAGING | Facility: CLINIC | Age: 83
End: 2023-02-03
Payer: MEDICARE

## 2023-02-03 DIAGNOSIS — I63.9 CEREBRAL INFARCTION, UNSPECIFIED: ICD-10-CM

## 2023-02-03 DIAGNOSIS — Z98.890 OTHER SPECIFIED POSTPROCEDURAL STATES: Chronic | ICD-10-CM

## 2023-02-03 PROCEDURE — 70544 MR ANGIOGRAPHY HEAD W/O DYE: CPT | Mod: 26,MH,59

## 2023-02-03 PROCEDURE — 70549 MR ANGIOGRAPH NECK W/O&W/DYE: CPT | Mod: 26,MH

## 2023-02-03 PROCEDURE — 70551 MRI BRAIN STEM W/O DYE: CPT | Mod: 26,MH

## 2023-02-03 PROCEDURE — 70544 MR ANGIOGRAPHY HEAD W/O DYE: CPT

## 2023-02-03 PROCEDURE — 70549 MR ANGIOGRAPH NECK W/O&W/DYE: CPT

## 2023-02-03 PROCEDURE — 70551 MRI BRAIN STEM W/O DYE: CPT

## 2023-02-03 PROCEDURE — A9585: CPT

## 2023-02-07 ENCOUNTER — NON-APPOINTMENT (OUTPATIENT)
Age: 83
End: 2023-02-07

## 2023-02-13 ENCOUNTER — NON-APPOINTMENT (OUTPATIENT)
Age: 83
End: 2023-02-13

## 2023-02-13 ENCOUNTER — APPOINTMENT (OUTPATIENT)
Dept: ELECTROPHYSIOLOGY | Facility: CLINIC | Age: 83
End: 2023-02-13
Payer: MEDICARE

## 2023-02-13 PROCEDURE — 93298 REM INTERROG DEV EVAL SCRMS: CPT

## 2023-02-13 PROCEDURE — G2066: CPT

## 2023-03-20 ENCOUNTER — NON-APPOINTMENT (OUTPATIENT)
Age: 83
End: 2023-03-20

## 2023-03-20 ENCOUNTER — APPOINTMENT (OUTPATIENT)
Dept: ELECTROPHYSIOLOGY | Facility: CLINIC | Age: 83
End: 2023-03-20
Payer: MEDICARE

## 2023-03-20 PROCEDURE — 93298 REM INTERROG DEV EVAL SCRMS: CPT

## 2023-03-20 PROCEDURE — G2066: CPT

## 2023-04-24 ENCOUNTER — APPOINTMENT (OUTPATIENT)
Dept: ELECTROPHYSIOLOGY | Facility: CLINIC | Age: 83
End: 2023-04-24
Payer: MEDICARE

## 2023-04-24 ENCOUNTER — NON-APPOINTMENT (OUTPATIENT)
Age: 83
End: 2023-04-24

## 2023-04-24 PROCEDURE — G2066: CPT

## 2023-04-24 PROCEDURE — 93298 REM INTERROG DEV EVAL SCRMS: CPT

## 2023-05-05 ENCOUNTER — APPOINTMENT (OUTPATIENT)
Dept: NEUROLOGY | Facility: CLINIC | Age: 83
End: 2023-05-05
Payer: MEDICARE

## 2023-05-05 ENCOUNTER — RESULT REVIEW (OUTPATIENT)
Age: 83
End: 2023-05-05

## 2023-05-05 ENCOUNTER — APPOINTMENT (OUTPATIENT)
Dept: NEUROLOGY | Facility: CLINIC | Age: 83
End: 2023-05-05

## 2023-05-05 VITALS
HEIGHT: 68 IN | WEIGHT: 150 LBS | DIASTOLIC BLOOD PRESSURE: 76 MMHG | HEART RATE: 61 BPM | SYSTOLIC BLOOD PRESSURE: 138 MMHG | BODY MASS INDEX: 22.73 KG/M2

## 2023-05-05 DIAGNOSIS — M54.18 RADICULOPATHY, SACRAL AND SACROCOCCYGEAL REGION: ICD-10-CM

## 2023-05-05 PROCEDURE — 99214 OFFICE O/P EST MOD 30 MIN: CPT

## 2023-05-05 NOTE — ASSESSMENT
[FreeTextEntry1] : 82 year old man with HTN, DM, prior tobacco use, L ROSA stroke in November 2022, presenting to stroke clinic for follow up.  Complains of pain in bilateral proximal lower extremities.  On exam, has R facial, dysarthria, and R arm weakness, as well as bilateral proximal lower extremity weakness.  Imaging reviewed, shows acute L ROSA stroke in November, and chronic L cerebellar stroke.  No significant carotid stenosis.  \par \par The ROSA and arguably the cerebellar stroke are embolic, without a clear source.  He does have a loop recorder implanted, and last report from April 2023 shows no detection of afib.  \par The pain in his proximal lower extremities may be related to lumbar radiculopathy.  Lower suspicion for myopathy, as the pain precedes his starting on a statin, but will send labs.  \par \par -will obtain a CT CAP to evaluate his ESUS, given the absence of a source at this point.  \par -continue monitoring for afib \par -will obtain MRI L spine to assess for spondylosis/radiculopathy \par -sending CPK, LDH, aldolase, lipid panel and A1c\par -continue ASA 81mg daily for monotherapy\par -continue finofibrate, given his statin intolerance.  Pending lipid profile results, may start on ezetimibe.  Goal LDL <70\par -A1c at goal at this point.  \par -normotension\par -return to stroke neurology in 6 months.

## 2023-05-05 NOTE — PHYSICAL EXAM
[FreeTextEntry1] : GEN: sitting up in chair, NAD\par CV: RRR, S1, S2\par PULM: CTAB\par GI: soft, non tender\par HEENT: no nuchal rigidity, no temporal tenderness\par EXTREM: no CCE.  No stigmata of emboli.  \par \par NEURO: \par Awake, alert, oriented to date, and clinic.  Normal naming, fluency, and repetition.  Follows commands, crossing midline. \par Pupils 3-2mm, symmetric, full VF's, full EOM, no nystagmus, no papilledema, symmetric light touch sensation, R facial weakness at rest, symmetric on activation, trace dysarthria, tongue midline, palate symmetric. \par MOTOR: normal bulk and tone.  \par 5/5 deltoids, 4+/5 biceps and triceps on the R, 5/5 on the L.  5/5 symmetric wrist extension and .  \par 4/5 hip flexors, 5/5 knee extension and flexion, 5/5 ankle dorsiflexion. \par SENSORY: intact symmetrically to light touch throughout\par COORDINATION: normal FNF\par REFLEXES: 1+ symmetric BB, BR, triceps, 2+ patellar, 1+ achilles, symmetric. \par GAIT: narrow based.  Stands on one leg, R & L, but more effortful on L.  Negative Rhomberg.

## 2023-05-05 NOTE — HISTORY OF PRESENT ILLNESS
[FreeTextEntry1] : 82 year old man with history of L ROSA stroke in Nov 2022, HTN, DM, presenting to stroke neurology for follow up. \par \par Last seen in February 2023, reported some residual R sided weakness at the time.  Maintained on single antiplatelet and statin, but now discontinued statin due to muscle ache/cramp.  \par \par Today returns with his wife to report he maintains his independence and mobility.  He cares for ADL's independently, and walks without any assistive devices, although he does note that there is trace residual weakness on the R leg.  He denies any new neurological complaints, including cognitive changes, visual disturbances, speech/language difficulty, or new/worsening weakness in the arms or legs, and has no sensory symptoms in the extremities either.  He does feel his gait is not 100% compared to prior, but he is still able to walk long distances without fatigue.  \par \par His primary concern today is pain in the bilateral hamstrings that he has been experiencing since last year in November, starting just 1 week prior to his stroke.  He feels a sharp, searing pain, in the upper hamstring, that is mostly present when he is upright, either sitting or standing.  When he is supine, he has slight relief.  He says the pain has been constant during these last few months.  There is no change to the pain if he is walking up or down stairs, or if he changes his posture in other ways.  His PMD did do a lumbar XR, and patient says he was told it showed spinal narrowing.  \par \par Overall, he reports doing well.  He denies having any new medical complaints.  No palpitations, lightheadedness, chest pain, shortness of breath, weight loss, or fevers.  \par \par PMHx: \par DM: takes metformin and jardiance\par HTN: takes daily amlodipine\par Stroke: takes daily ASA 81mg (loop recorder is in place)\par HLD: taking fenofibrate.  Did not tolerate atorvastatin 80. \par \par SHx: \par Retired M[employer] , lives with wife\par Prior tobacco use, from teenager to age 51, about 5 cigarettes daily\par No ETOH or drug use.

## 2023-05-26 ENCOUNTER — NON-APPOINTMENT (OUTPATIENT)
Age: 83
End: 2023-05-26

## 2023-05-26 ENCOUNTER — APPOINTMENT (OUTPATIENT)
Dept: ELECTROPHYSIOLOGY | Facility: CLINIC | Age: 83
End: 2023-05-26
Payer: MEDICARE

## 2023-05-26 PROCEDURE — G2066: CPT

## 2023-05-26 PROCEDURE — 93298 REM INTERROG DEV EVAL SCRMS: CPT

## 2023-06-30 ENCOUNTER — APPOINTMENT (OUTPATIENT)
Dept: ELECTROPHYSIOLOGY | Facility: CLINIC | Age: 83
End: 2023-06-30
Payer: MEDICARE

## 2023-06-30 ENCOUNTER — NON-APPOINTMENT (OUTPATIENT)
Age: 83
End: 2023-06-30

## 2023-06-30 PROCEDURE — 93298 REM INTERROG DEV EVAL SCRMS: CPT

## 2023-06-30 PROCEDURE — G2066: CPT

## 2023-08-04 ENCOUNTER — NON-APPOINTMENT (OUTPATIENT)
Age: 83
End: 2023-08-04

## 2023-08-04 ENCOUNTER — APPOINTMENT (OUTPATIENT)
Dept: ELECTROPHYSIOLOGY | Facility: CLINIC | Age: 83
End: 2023-08-04
Payer: MEDICARE

## 2023-08-04 PROCEDURE — G2066: CPT

## 2023-08-04 PROCEDURE — 93298 REM INTERROG DEV EVAL SCRMS: CPT

## 2023-09-08 ENCOUNTER — NON-APPOINTMENT (OUTPATIENT)
Age: 83
End: 2023-09-08

## 2023-09-08 ENCOUNTER — APPOINTMENT (OUTPATIENT)
Dept: ELECTROPHYSIOLOGY | Facility: CLINIC | Age: 83
End: 2023-09-08
Payer: MEDICARE

## 2023-09-08 PROCEDURE — 93298 REM INTERROG DEV EVAL SCRMS: CPT

## 2023-09-08 PROCEDURE — G2066: CPT

## 2023-10-11 ENCOUNTER — NON-APPOINTMENT (OUTPATIENT)
Age: 83
End: 2023-10-11

## 2023-10-11 ENCOUNTER — APPOINTMENT (OUTPATIENT)
Dept: ELECTROPHYSIOLOGY | Facility: CLINIC | Age: 83
End: 2023-10-11
Payer: MEDICARE

## 2023-10-11 PROCEDURE — 93298 REM INTERROG DEV EVAL SCRMS: CPT

## 2023-10-11 PROCEDURE — G2066: CPT

## 2023-10-19 ENCOUNTER — RESULT REVIEW (OUTPATIENT)
Age: 83
End: 2023-10-19

## 2023-10-20 ENCOUNTER — APPOINTMENT (OUTPATIENT)
Dept: CT IMAGING | Facility: CLINIC | Age: 83
End: 2023-10-20
Payer: MEDICARE

## 2023-10-20 ENCOUNTER — OUTPATIENT (OUTPATIENT)
Dept: OUTPATIENT SERVICES | Facility: HOSPITAL | Age: 83
LOS: 1 days | End: 2023-10-20
Payer: MEDICARE

## 2023-10-20 ENCOUNTER — APPOINTMENT (OUTPATIENT)
Dept: MRI IMAGING | Facility: CLINIC | Age: 83
End: 2023-10-20
Payer: MEDICARE

## 2023-10-20 DIAGNOSIS — Z98.890 OTHER SPECIFIED POSTPROCEDURAL STATES: Chronic | ICD-10-CM

## 2023-10-20 DIAGNOSIS — M54.18 RADICULOPATHY, SACRAL AND SACROCOCCYGEAL REGION: ICD-10-CM

## 2023-10-20 DIAGNOSIS — I63.9 CEREBRAL INFARCTION, UNSPECIFIED: ICD-10-CM

## 2023-10-20 PROCEDURE — 74177 CT ABD & PELVIS W/CONTRAST: CPT

## 2023-10-20 PROCEDURE — 72148 MRI LUMBAR SPINE W/O DYE: CPT | Mod: 26,MH

## 2023-10-20 PROCEDURE — 74177 CT ABD & PELVIS W/CONTRAST: CPT | Mod: 26,MH

## 2023-10-20 PROCEDURE — 71260 CT THORAX DX C+: CPT | Mod: 26,MH

## 2023-10-20 PROCEDURE — 72148 MRI LUMBAR SPINE W/O DYE: CPT

## 2023-10-20 PROCEDURE — 71260 CT THORAX DX C+: CPT

## 2023-11-03 ENCOUNTER — APPOINTMENT (OUTPATIENT)
Dept: NEUROLOGY | Facility: CLINIC | Age: 83
End: 2023-11-03
Payer: MEDICARE

## 2023-11-03 PROCEDURE — 99213 OFFICE O/P EST LOW 20 MIN: CPT

## 2023-11-03 RX ORDER — GABAPENTIN 300 MG/1
300 CAPSULE ORAL
Qty: 90 | Refills: 1 | Status: ACTIVE | COMMUNITY
Start: 2023-11-03 | End: 1900-01-01

## 2023-11-15 ENCOUNTER — NON-APPOINTMENT (OUTPATIENT)
Age: 83
End: 2023-11-15

## 2023-11-15 ENCOUNTER — APPOINTMENT (OUTPATIENT)
Dept: ELECTROPHYSIOLOGY | Facility: CLINIC | Age: 83
End: 2023-11-15
Payer: MEDICARE

## 2023-11-16 PROCEDURE — G2066: CPT | Mod: NC

## 2023-11-16 PROCEDURE — 93298 REM INTERROG DEV EVAL SCRMS: CPT | Mod: NC

## 2023-11-17 ENCOUNTER — NON-APPOINTMENT (OUTPATIENT)
Age: 83
End: 2023-11-17

## 2023-12-01 ENCOUNTER — APPOINTMENT (OUTPATIENT)
Dept: UROLOGY | Facility: CLINIC | Age: 83
End: 2023-12-01

## 2023-12-06 ENCOUNTER — APPOINTMENT (OUTPATIENT)
Dept: UROLOGY | Facility: CLINIC | Age: 83
End: 2023-12-06
Payer: MEDICARE

## 2023-12-06 ENCOUNTER — APPOINTMENT (OUTPATIENT)
Dept: ELECTROPHYSIOLOGY | Facility: CLINIC | Age: 83
End: 2023-12-06
Payer: MEDICARE

## 2023-12-06 VITALS
SYSTOLIC BLOOD PRESSURE: 170 MMHG | RESPIRATION RATE: 16 BRPM | TEMPERATURE: 97.2 F | DIASTOLIC BLOOD PRESSURE: 83 MMHG | HEIGHT: 69 IN | BODY MASS INDEX: 22.22 KG/M2 | WEIGHT: 150 LBS | HEART RATE: 66 BPM

## 2023-12-06 VITALS — HEART RATE: 62 BPM | DIASTOLIC BLOOD PRESSURE: 71 MMHG | SYSTOLIC BLOOD PRESSURE: 163 MMHG | RESPIRATION RATE: 14 BRPM

## 2023-12-06 DIAGNOSIS — N28.89 OTHER SPECIFIED DISORDERS OF KIDNEY AND URETER: ICD-10-CM

## 2023-12-06 PROCEDURE — 93291 INTERROG DEV EVAL SCRMS IP: CPT

## 2023-12-06 PROCEDURE — 99204 OFFICE O/P NEW MOD 45 MIN: CPT

## 2023-12-06 RX ORDER — VALACYCLOVIR 1 G/1
1 TABLET, FILM COATED ORAL
Refills: 0 | Status: ACTIVE | COMMUNITY

## 2023-12-08 LAB
APPEARANCE: CLEAR
BACTERIA: NEGATIVE /HPF
BILIRUBIN URINE: NEGATIVE
BLOOD URINE: NEGATIVE
CAST: 0 /LPF
COLOR: YELLOW
EPITHELIAL CELLS: 0 /HPF
GLUCOSE QUALITATIVE U: >=1000 MG/DL
KETONES URINE: NEGATIVE MG/DL
LEUKOCYTE ESTERASE URINE: NEGATIVE
MICROSCOPIC-UA: NORMAL
NITRITE URINE: NEGATIVE
PH URINE: 7
PROTEIN URINE: NORMAL MG/DL
RED BLOOD CELLS URINE: 0 /HPF
SPECIFIC GRAVITY URINE: 1.03
UROBILINOGEN URINE: 0.2 MG/DL
WHITE BLOOD CELLS URINE: 0 /HPF

## 2024-01-08 ENCOUNTER — NON-APPOINTMENT (OUTPATIENT)
Age: 84
End: 2024-01-08

## 2024-01-08 ENCOUNTER — APPOINTMENT (OUTPATIENT)
Dept: ELECTROPHYSIOLOGY | Facility: CLINIC | Age: 84
End: 2024-01-08
Payer: MEDICARE

## 2024-01-08 PROCEDURE — 93298 REM INTERROG DEV EVAL SCRMS: CPT

## 2024-02-08 ENCOUNTER — APPOINTMENT (OUTPATIENT)
Dept: ELECTROPHYSIOLOGY | Facility: CLINIC | Age: 84
End: 2024-02-08
Payer: MEDICARE

## 2024-02-08 ENCOUNTER — NON-APPOINTMENT (OUTPATIENT)
Age: 84
End: 2024-02-08

## 2024-02-09 PROCEDURE — 93298 REM INTERROG DEV EVAL SCRMS: CPT

## 2024-03-14 ENCOUNTER — APPOINTMENT (OUTPATIENT)
Dept: ELECTROPHYSIOLOGY | Facility: CLINIC | Age: 84
End: 2024-03-14
Payer: MEDICARE

## 2024-03-14 ENCOUNTER — NON-APPOINTMENT (OUTPATIENT)
Age: 84
End: 2024-03-14

## 2024-03-14 PROCEDURE — 93298 REM INTERROG DEV EVAL SCRMS: CPT

## 2024-04-18 ENCOUNTER — APPOINTMENT (OUTPATIENT)
Dept: ELECTROPHYSIOLOGY | Facility: CLINIC | Age: 84
End: 2024-04-18
Payer: MEDICARE

## 2024-04-18 ENCOUNTER — NON-APPOINTMENT (OUTPATIENT)
Age: 84
End: 2024-04-18

## 2024-04-18 PROCEDURE — 93298 REM INTERROG DEV EVAL SCRMS: CPT

## 2024-05-10 ENCOUNTER — APPOINTMENT (OUTPATIENT)
Dept: NEUROLOGY | Facility: CLINIC | Age: 84
End: 2024-05-10
Payer: MEDICARE

## 2024-05-10 VITALS
WEIGHT: 140 LBS | HEART RATE: 60 BPM | BODY MASS INDEX: 20.73 KG/M2 | HEIGHT: 69 IN | DIASTOLIC BLOOD PRESSURE: 61 MMHG | SYSTOLIC BLOOD PRESSURE: 130 MMHG

## 2024-05-10 DIAGNOSIS — G90.09 OTHER IDIOPATHIC PERIPHERAL AUTONOMIC NEUROPATHY: ICD-10-CM

## 2024-05-10 DIAGNOSIS — I48.0 PAROXYSMAL ATRIAL FIBRILLATION: ICD-10-CM

## 2024-05-10 DIAGNOSIS — I63.9 CEREBRAL INFARCTION, UNSPECIFIED: ICD-10-CM

## 2024-05-10 LAB
ALDOLASE SERPL-CCNC: 7.9 U/L
CHOLEST SERPL-MCNC: 143 MG/DL
CK SERPL-CCNC: 68 U/L
ESTIMATED AVERAGE GLUCOSE: 140 MG/DL
HBA1C MFR BLD HPLC: 6.5 %
HDLC SERPL-MCNC: 60 MG/DL
LDH SERPL-CCNC: 170 IU/L
LDH1 CFR SERPL ELPH: 35 %
LDH2 CFR SERPL ELPH: 33 %
LDH3 CFR SERPL ELPH: 18 %
LDH4 CFR SERPL ELPH: 6 %
LDH5 CFR SERPL ELPH: 8 %
LDLC SERPL CALC-MCNC: 70 MG/DL
NONHDLC SERPL-MCNC: 83 MG/DL
TRIGL SERPL-MCNC: 61 MG/DL

## 2024-05-10 PROCEDURE — 99213 OFFICE O/P EST LOW 20 MIN: CPT

## 2024-05-10 RX ORDER — GABAPENTIN 600 MG/1
600 TABLET, COATED ORAL
Qty: 90 | Refills: 1 | Status: ACTIVE | COMMUNITY
Start: 2024-05-10 | End: 1900-01-01

## 2024-05-10 NOTE — ASSESSMENT
[FreeTextEntry1] : 82 year old man with HTN, DM, prior tobacco use, L ROSA stroke in November 2022, presenting to stroke clinic for follow up. Complains of pain in bilateral proximal lower extremities. On exam, no deficits today, prior R leg weakness essentially resolved. Imaging shows acute L ROSA stroke in November, and chronic L cerebellar stroke. Cardiac monitoring detecting afib, The CT abdomen is concerning for a L renal mass.  -continue eliquis 5mg BID for stroke prevention in setting of afib -continue ezetimibe -will increase nightly gabapentin to 600mg  -normotension -return to stroke neurology in 1 year

## 2024-05-10 NOTE — HISTORY OF PRESENT ILLNESS
[FreeTextEntry1] : 82 year old man with history of L ROSA stroke in Nov 2022, recently discovered afib, now on eliquis, HTN, DM, renal mass, returning to to stroke neurology for follow up. Patient last seen in Nov 2023. At the time, sent for heme/onc workup due to renal mass on CT abdomen.    Since the last visit, had afib detected on ILR, and started on eliquis.  Reports compliance, and no signs of bleeding.  Also followed with heme/onc, planning for repeat imaging this month for renal mass.    Only complaint today is continued discomfort in the back of th thighs and shins when falling asleep.  Otherwise, no new neurological complaints, including headache, visual disturbance, weakness, or gait imbalance.  No medical complaints, including fevers, chills, weight loss, chest pain, or shortness of breath.    PMHx: DM: takes metformin and jardiance HTN: takes daily amlodipine Stroke on eliquis Afib on eliquis HLD: taking fenofibrate and ezetimibe. Did not tolerate atorvastatin 80.  SHx: Retired M[employer] , lives with wife Prior tobacco use, from teenager to age 51, about 5 cigarettes daily No ETOH or drug use.  On Exam GEN: NAD CV: RRR, S1, S2 PULM: CTAB NEURO: Awake, alert, oriented to month, date, year, and clinic. Normal naming, fluency, and repetition. Follows commands, crossing midline.  Pupils 3-2mm, symmetric, full VF's, full EOM, no nystagmus, no papilledema, symmetric light touch sensation, no facial weakness or dysarthria, tongue midline, palate symmetric. MOTOR: normal bulk and tone.5/5 deltoids, 5/5 biceps and triceps,  and wrist extension, symmetric. 5/5 hip flexors, knee extension and flexion, and ankle dorsiflexion. SENSORY: intact symmetrically to light touch throughout COORDINATION: normal FNF REFLEXES: trace BB, BR, triceps, symmetric, 1+ patellar, trace achilles, symmetric. GAIT: narrow based. Stands on one leg, R & L, but more effortful on L. Negative Rhomberg.  CT CAP: Indeterminate posterior left lower pole renal lesion measuring 3.1 x 2.5 cm concerning for renal cell carcinoma till proven otherwise

## 2024-05-23 ENCOUNTER — APPOINTMENT (OUTPATIENT)
Dept: ELECTROPHYSIOLOGY | Facility: CLINIC | Age: 84
End: 2024-05-23
Payer: MEDICARE

## 2024-05-23 ENCOUNTER — NON-APPOINTMENT (OUTPATIENT)
Age: 84
End: 2024-05-23

## 2024-05-23 PROCEDURE — 93298 REM INTERROG DEV EVAL SCRMS: CPT

## 2024-05-24 RX ORDER — APIXABAN 5 MG/1
5 TABLET, FILM COATED ORAL
Qty: 60 | Refills: 5 | Status: ACTIVE | COMMUNITY
Start: 2023-11-28 | End: 1900-01-01

## 2024-05-28 ENCOUNTER — OUTPATIENT (OUTPATIENT)
Dept: OUTPATIENT SERVICES | Facility: HOSPITAL | Age: 84
LOS: 1 days | End: 2024-05-28
Payer: MEDICARE

## 2024-05-28 ENCOUNTER — APPOINTMENT (OUTPATIENT)
Dept: MRI IMAGING | Facility: IMAGING CENTER | Age: 84
End: 2024-05-28
Payer: MEDICARE

## 2024-05-28 DIAGNOSIS — Z98.890 OTHER SPECIFIED POSTPROCEDURAL STATES: Chronic | ICD-10-CM

## 2024-05-28 DIAGNOSIS — N28.89 OTHER SPECIFIED DISORDERS OF KIDNEY AND URETER: ICD-10-CM

## 2024-05-28 PROCEDURE — A9585: CPT

## 2024-05-28 PROCEDURE — 74183 MRI ABD W/O CNTR FLWD CNTR: CPT

## 2024-05-28 PROCEDURE — 74183 MRI ABD W/O CNTR FLWD CNTR: CPT | Mod: 26,MH

## 2024-06-21 ENCOUNTER — APPOINTMENT (OUTPATIENT)
Dept: UROLOGY | Facility: CLINIC | Age: 84
End: 2024-06-21
Payer: MEDICARE

## 2024-06-21 DIAGNOSIS — N28.1 CYST OF KIDNEY, ACQUIRED: ICD-10-CM

## 2024-06-21 PROCEDURE — 99213 OFFICE O/P EST LOW 20 MIN: CPT

## 2024-06-21 PROCEDURE — G2211 COMPLEX E/M VISIT ADD ON: CPT

## 2024-06-21 NOTE — HISTORY OF PRESENT ILLNESS
[FreeTextEntry1] : here for management of an incidentally noted left renal mass. Had CT scan noting 3.1 x 2.5cm lower pole left renal mass. no other lesions; no prior renal masses. no h/o hematuria or flank pain.   reviewed imaging with patient and family: lower pole exophytic somewhat hypo-enhancing mass.   6/21/24 - Here to discuss f/u MRI done on 6/5/24.  Denies any urological complaints. Never any hematuria. No flank pain.  MRI reveals:   No hydronephrosis. 4.4 x 4.2 x 5.5 cm exophytic multiseptated right mid/lower pole cyst. 2.5 x 2.8 x 2.4 cm left renal lower pole exophytic complex lesion containing hemorrhagic product without fracture diffusion no significant enhancement postcontrast

## 2024-06-21 NOTE — ASSESSMENT
[FreeTextEntry1] : Discussed MRI finding and the necessity for additional MRI in one year lesion not enhancing mass, rather complex non-suspicious cyst  imaging reviewed during the visit

## 2024-06-27 ENCOUNTER — APPOINTMENT (OUTPATIENT)
Dept: ELECTROPHYSIOLOGY | Facility: CLINIC | Age: 84
End: 2024-06-27
Payer: MEDICARE

## 2024-06-27 ENCOUNTER — NON-APPOINTMENT (OUTPATIENT)
Age: 84
End: 2024-06-27

## 2024-06-27 PROCEDURE — 93298 REM INTERROG DEV EVAL SCRMS: CPT

## 2024-08-01 ENCOUNTER — NON-APPOINTMENT (OUTPATIENT)
Age: 84
End: 2024-08-01

## 2024-08-01 ENCOUNTER — APPOINTMENT (OUTPATIENT)
Dept: ELECTROPHYSIOLOGY | Facility: CLINIC | Age: 84
End: 2024-08-01
Payer: MEDICARE

## 2024-08-01 PROCEDURE — 93298 REM INTERROG DEV EVAL SCRMS: CPT

## 2024-09-06 ENCOUNTER — NON-APPOINTMENT (OUTPATIENT)
Age: 84
End: 2024-09-06

## 2024-09-06 ENCOUNTER — APPOINTMENT (OUTPATIENT)
Dept: ELECTROPHYSIOLOGY | Facility: CLINIC | Age: 84
End: 2024-09-06
Payer: MEDICARE

## 2024-09-06 PROCEDURE — 93298 REM INTERROG DEV EVAL SCRMS: CPT

## 2024-10-09 ENCOUNTER — APPOINTMENT (OUTPATIENT)
Dept: ELECTROPHYSIOLOGY | Facility: CLINIC | Age: 84
End: 2024-10-09

## 2024-10-09 ENCOUNTER — NON-APPOINTMENT (OUTPATIENT)
Age: 84
End: 2024-10-09

## 2024-10-09 PROCEDURE — 93298 REM INTERROG DEV EVAL SCRMS: CPT

## 2024-11-13 ENCOUNTER — NON-APPOINTMENT (OUTPATIENT)
Age: 84
End: 2024-11-13

## 2024-11-13 ENCOUNTER — APPOINTMENT (OUTPATIENT)
Dept: ELECTROPHYSIOLOGY | Facility: CLINIC | Age: 84
End: 2024-11-13
Payer: MEDICARE

## 2024-11-13 PROCEDURE — 93298 REM INTERROG DEV EVAL SCRMS: CPT

## 2024-12-06 ENCOUNTER — APPOINTMENT (OUTPATIENT)
Dept: ELECTROPHYSIOLOGY | Facility: CLINIC | Age: 84
End: 2024-12-06

## 2024-12-18 ENCOUNTER — APPOINTMENT (OUTPATIENT)
Dept: ELECTROPHYSIOLOGY | Facility: CLINIC | Age: 84
End: 2024-12-18
Payer: MEDICARE

## 2024-12-18 ENCOUNTER — NON-APPOINTMENT (OUTPATIENT)
Age: 84
End: 2024-12-18

## 2024-12-18 PROCEDURE — 93298 REM INTERROG DEV EVAL SCRMS: CPT

## 2025-01-21 ENCOUNTER — APPOINTMENT (OUTPATIENT)
Dept: ELECTROPHYSIOLOGY | Facility: CLINIC | Age: 85
End: 2025-01-21
Payer: MEDICARE

## 2025-01-21 ENCOUNTER — NON-APPOINTMENT (OUTPATIENT)
Age: 85
End: 2025-01-21

## 2025-01-21 PROCEDURE — 93298 REM INTERROG DEV EVAL SCRMS: CPT

## 2025-02-24 ENCOUNTER — APPOINTMENT (OUTPATIENT)
Dept: ELECTROPHYSIOLOGY | Facility: CLINIC | Age: 85
End: 2025-02-24
Payer: MEDICARE

## 2025-02-24 ENCOUNTER — NON-APPOINTMENT (OUTPATIENT)
Age: 85
End: 2025-02-24

## 2025-02-24 PROCEDURE — 93298 REM INTERROG DEV EVAL SCRMS: CPT

## 2025-03-31 ENCOUNTER — NON-APPOINTMENT (OUTPATIENT)
Age: 85
End: 2025-03-31

## 2025-03-31 ENCOUNTER — APPOINTMENT (OUTPATIENT)
Dept: ELECTROPHYSIOLOGY | Facility: CLINIC | Age: 85
End: 2025-03-31
Payer: MEDICARE

## 2025-03-31 PROCEDURE — 93298 REM INTERROG DEV EVAL SCRMS: CPT

## 2025-05-02 ENCOUNTER — APPOINTMENT (OUTPATIENT)
Dept: ELECTROPHYSIOLOGY | Facility: CLINIC | Age: 85
End: 2025-05-02
Payer: MEDICARE

## 2025-05-02 ENCOUNTER — NON-APPOINTMENT (OUTPATIENT)
Age: 85
End: 2025-05-02

## 2025-05-02 PROCEDURE — 93298 REM INTERROG DEV EVAL SCRMS: CPT

## 2025-05-09 ENCOUNTER — APPOINTMENT (OUTPATIENT)
Dept: NEUROLOGY | Facility: CLINIC | Age: 85
End: 2025-05-09

## 2025-05-09 VITALS
WEIGHT: 140 LBS | HEART RATE: 60 BPM | DIASTOLIC BLOOD PRESSURE: 68 MMHG | HEIGHT: 69 IN | BODY MASS INDEX: 20.73 KG/M2 | OXYGEN SATURATION: 98 % | SYSTOLIC BLOOD PRESSURE: 131 MMHG

## 2025-05-09 DIAGNOSIS — D69.6 THROMBOCYTOPENIA, UNSPECIFIED: ICD-10-CM

## 2025-05-09 DIAGNOSIS — I63.9 CEREBRAL INFARCTION, UNSPECIFIED: ICD-10-CM

## 2025-05-09 DIAGNOSIS — I48.0 PAROXYSMAL ATRIAL FIBRILLATION: ICD-10-CM

## 2025-05-09 PROCEDURE — 99215 OFFICE O/P EST HI 40 MIN: CPT

## 2025-06-06 ENCOUNTER — NON-APPOINTMENT (OUTPATIENT)
Age: 85
End: 2025-06-06

## 2025-06-06 ENCOUNTER — APPOINTMENT (OUTPATIENT)
Dept: ELECTROPHYSIOLOGY | Facility: CLINIC | Age: 85
End: 2025-06-06
Payer: MEDICARE

## 2025-06-06 PROCEDURE — 93298 REM INTERROG DEV EVAL SCRMS: CPT

## 2025-06-20 ENCOUNTER — OUTPATIENT (OUTPATIENT)
Dept: OUTPATIENT SERVICES | Facility: HOSPITAL | Age: 85
LOS: 1 days | End: 2025-06-20
Payer: MEDICARE

## 2025-06-20 ENCOUNTER — APPOINTMENT (OUTPATIENT)
Dept: MRI IMAGING | Facility: IMAGING CENTER | Age: 85
End: 2025-06-20

## 2025-06-20 DIAGNOSIS — N28.89 OTHER SPECIFIED DISORDERS OF KIDNEY AND URETER: ICD-10-CM

## 2025-06-20 PROCEDURE — 74183 MRI ABD W/O CNTR FLWD CNTR: CPT

## 2025-06-20 PROCEDURE — A9585: CPT

## 2025-06-20 PROCEDURE — 74183 MRI ABD W/O CNTR FLWD CNTR: CPT | Mod: 26

## 2025-07-11 ENCOUNTER — APPOINTMENT (OUTPATIENT)
Dept: ELECTROPHYSIOLOGY | Facility: CLINIC | Age: 85
End: 2025-07-11

## 2025-07-25 ENCOUNTER — NON-APPOINTMENT (OUTPATIENT)
Age: 85
End: 2025-07-25

## 2025-07-25 ENCOUNTER — APPOINTMENT (OUTPATIENT)
Dept: UROLOGY | Facility: CLINIC | Age: 85
End: 2025-07-25
Payer: MEDICARE

## 2025-07-25 VITALS
SYSTOLIC BLOOD PRESSURE: 112 MMHG | DIASTOLIC BLOOD PRESSURE: 56 MMHG | HEART RATE: 62 BPM | RESPIRATION RATE: 16 BRPM | TEMPERATURE: 98 F

## 2025-07-25 DIAGNOSIS — N28.89 OTHER SPECIFIED DISORDERS OF KIDNEY AND URETER: ICD-10-CM

## 2025-07-25 DIAGNOSIS — R39.9 UNSPECIFIED SYMPTOMS AND SIGNS INVOLVING THE GENITOURINARY SYSTEM: ICD-10-CM

## 2025-07-25 DIAGNOSIS — N28.1 CYST OF KIDNEY, ACQUIRED: ICD-10-CM

## 2025-07-25 PROCEDURE — 99213 OFFICE O/P EST LOW 20 MIN: CPT

## 2025-07-29 LAB
APPEARANCE: CLEAR
BACTERIA UR CULT: NORMAL
BACTERIA: NEGATIVE /HPF
BILIRUBIN URINE: NEGATIVE
BLOOD URINE: NEGATIVE
CAST: 0 /LPF
COLOR: YELLOW
EPITHELIAL CELLS: 2 /HPF
GLUCOSE QUALITATIVE U: >=1000 MG/DL
KETONES URINE: NEGATIVE MG/DL
LEUKOCYTE ESTERASE URINE: ABNORMAL
MICROSCOPIC-UA: NORMAL
NITRITE URINE: NEGATIVE
PH URINE: 6
PROTEIN URINE: NEGATIVE MG/DL
RED BLOOD CELLS URINE: 0 /HPF
SPECIFIC GRAVITY URINE: >1.03
UROBILINOGEN URINE: 0.2 MG/DL
WHITE BLOOD CELLS URINE: 24 /HPF